# Patient Record
Sex: FEMALE | Race: WHITE | NOT HISPANIC OR LATINO | Employment: FULL TIME | URBAN - NONMETROPOLITAN AREA
[De-identification: names, ages, dates, MRNs, and addresses within clinical notes are randomized per-mention and may not be internally consistent; named-entity substitution may affect disease eponyms.]

---

## 2023-03-31 DIAGNOSIS — G43.009 MIGRAINE WITHOUT AURA AND WITHOUT STATUS MIGRAINOSUS, NOT INTRACTABLE: Primary | ICD-10-CM

## 2023-03-31 RX ORDER — TOPIRAMATE 25 MG/1
TABLET ORAL
Qty: 60 TABLET | Refills: 0 | Status: SHIPPED | OUTPATIENT
Start: 2023-03-31 | End: 2023-05-15

## 2023-04-14 ENCOUNTER — OFFICE VISIT (OUTPATIENT)
Dept: PRIMARY CARE | Facility: CLINIC | Age: 63
End: 2023-04-14

## 2023-04-14 VITALS
HEART RATE: 90 BPM | SYSTOLIC BLOOD PRESSURE: 108 MMHG | OXYGEN SATURATION: 98 % | WEIGHT: 123.5 LBS | DIASTOLIC BLOOD PRESSURE: 80 MMHG | HEIGHT: 63 IN | BODY MASS INDEX: 21.88 KG/M2

## 2023-04-14 DIAGNOSIS — H81.11 BPPV (BENIGN PAROXYSMAL POSITIONAL VERTIGO), RIGHT: ICD-10-CM

## 2023-04-14 DIAGNOSIS — M25.512 CHRONIC LEFT SHOULDER PAIN: Primary | ICD-10-CM

## 2023-04-14 DIAGNOSIS — G89.29 CHRONIC LEFT SHOULDER PAIN: Primary | ICD-10-CM

## 2023-04-14 DIAGNOSIS — Z00.00 ROUTINE GENERAL MEDICAL EXAMINATION AT A HEALTH CARE FACILITY: ICD-10-CM

## 2023-04-14 DIAGNOSIS — G43.009 MIGRAINE WITHOUT AURA AND WITHOUT STATUS MIGRAINOSUS, NOT INTRACTABLE: ICD-10-CM

## 2023-04-14 PROBLEM — G43.909 MIGRAINES: Status: ACTIVE | Noted: 2023-04-14

## 2023-04-14 PROBLEM — R56.9 SEIZURE-LIKE ACTIVITY (MULTI): Status: ACTIVE | Noted: 2023-04-14

## 2023-04-14 PROBLEM — G56.03 BILATERAL CARPAL TUNNEL SYNDROME: Status: ACTIVE | Noted: 2023-04-14

## 2023-04-14 PROCEDURE — 99213 OFFICE O/P EST LOW 20 MIN: CPT | Performed by: FAMILY MEDICINE

## 2023-04-14 ASSESSMENT — ENCOUNTER SYMPTOMS
ARTHRALGIAS: 1
APPETITE CHANGE: 0
PALPITATIONS: 0
HEADACHES: 1
CONSTIPATION: 0
DIFFICULTY URINATING: 0
FEVER: 0
TROUBLE SWALLOWING: 0
SEIZURES: 0
SHORTNESS OF BREATH: 0
UNEXPECTED WEIGHT CHANGE: 0
DIARRHEA: 0
COLOR CHANGE: 0
BLOOD IN STOOL: 0
JOINT SWELLING: 0
CONFUSION: 0
HEMATURIA: 0
NERVOUS/ANXIOUS: 0

## 2023-04-14 ASSESSMENT — PATIENT HEALTH QUESTIONNAIRE - PHQ9
SUM OF ALL RESPONSES TO PHQ9 QUESTIONS 1 AND 2: 0
1. LITTLE INTEREST OR PLEASURE IN DOING THINGS: NOT AT ALL
2. FEELING DOWN, DEPRESSED OR HOPELESS: NOT AT ALL

## 2023-04-14 NOTE — PROGRESS NOTES
"Subjective   Patient ID: Katie Garcia is a 62 y.o. female who presents for Shoulder Pain (Right shoulder pain that goes up into her neck, pt is having trouble dressing herself and doing things around her house, fingers feel cold, right arm pain , ringing in ears, hemorrhoids, frequent bloody noses, lightheaded when pt lays down for bed  ).  Left shoulder pain:  -pop/clicking   -onset multiple months  -pain at rest and worse w/ activity w/ weakness.   -pain radiated to elbow  -no known trauma  -taking tylenol  -ice/heat    Migraines:  -no longer working- left because she was constantly anxious- leading to decrease appetite and diarrhea. +wt loss. Diarrhea improved now. Appetite good.   -migraines much better     Adopted and found siblings:  -appears they have some cardiac issues    Complains of dizziness that last just 10sec when lying in bed. Only in getting into bed. Not w/ standing. No cp,sob,palps,syncope      Shoulder Pain   Pertinent negatives include no fever.       Review of Systems   Constitutional:  Negative for appetite change, fever and unexpected weight change.   HENT:  Negative for congestion and trouble swallowing.    Eyes:  Negative for visual disturbance.   Respiratory:  Negative for shortness of breath.    Cardiovascular:  Negative for chest pain, palpitations and leg swelling.   Gastrointestinal:  Negative for blood in stool, constipation and diarrhea.   Genitourinary:  Negative for difficulty urinating and hematuria.   Musculoskeletal:  Positive for arthralgias. Negative for gait problem and joint swelling.   Skin:  Negative for color change.   Allergic/Immunologic: Negative for immunocompromised state.   Neurological:  Positive for headaches. Negative for seizures and syncope.   Psychiatric/Behavioral:  Negative for confusion and suicidal ideas. The patient is not nervous/anxious.        Objective   /80   Pulse 90   Ht 1.6 m (5' 3\")   Wt 56 kg (123 lb 8 oz)   SpO2 98%   BMI 21.88 " kg/m²     Physical Exam  Constitutional:       General: She is not in acute distress.     Appearance: Normal appearance. She is not toxic-appearing.   HENT:      Head: Normocephalic.      Right Ear: Tympanic membrane normal.      Left Ear: Tympanic membrane normal.      Nose: Nose normal. No congestion or rhinorrhea.      Mouth/Throat:      Mouth: Mucous membranes are moist.   Eyes:      General:         Right eye: No discharge.         Left eye: No discharge.      Pupils: Pupils are equal, round, and reactive to light.   Cardiovascular:      Rate and Rhythm: Normal rate and regular rhythm.      Heart sounds: No murmur heard.  Pulmonary:      Effort: No respiratory distress.      Breath sounds: No wheezing or rhonchi.   Abdominal:      General: Abdomen is flat.      Palpations: Abdomen is soft.   Musculoskeletal:      Comments: Left shoulder: decrease rom in abduction at 90def. +empty can, int/ext/lift, +bicep flex, +lewis   Skin:     General: Skin is warm and dry.      Capillary Refill: Capillary refill takes less than 2 seconds.   Neurological:      General: No focal deficit present.      Mental Status: She is alert.   Psychiatric:         Mood and Affect: Mood normal.       Assessment/Plan   Problem List Items Addressed This Visit          Other    Migraines     Other Visit Diagnoses       Chronic left shoulder pain    -  Primary    Routine general medical examination at a health care facility        Relevant Orders    CT cardiac scoring wo IV contrast    BPPV (benign paroxysmal positional vertigo), right              Assessment:  -Migraine  Topamax    -Seizure like activity: from possible trauma brain damage vs migraine sequela : neg EEG and MRI (2022)     -Fhx of cardiac dz  Cardiac score ordered    -Left shoulder pain:  Concerning for RTC vs bicep   MRI ordered    -Vertigo w. Lying  Suspect BPPV  Home exercises given     mammo: refused

## 2023-04-18 ASSESSMENT — PATIENT HEALTH QUESTIONNAIRE - PHQ9
1. LITTLE INTEREST OR PLEASURE IN DOING THINGS: NOT AT ALL
2. FEELING DOWN, DEPRESSED OR HOPELESS: NOT AT ALL
SUM OF ALL RESPONSES TO PHQ9 QUESTIONS 1 AND 2: 0

## 2023-05-14 DIAGNOSIS — G43.009 MIGRAINE WITHOUT AURA AND WITHOUT STATUS MIGRAINOSUS, NOT INTRACTABLE: ICD-10-CM

## 2023-05-15 DIAGNOSIS — G43.009 MIGRAINE WITHOUT AURA AND WITHOUT STATUS MIGRAINOSUS, NOT INTRACTABLE: ICD-10-CM

## 2023-05-15 RX ORDER — TOPIRAMATE 25 MG/1
TABLET ORAL
Qty: 180 TABLET | Refills: 3 | Status: SHIPPED | OUTPATIENT
Start: 2023-05-15 | End: 2023-05-15 | Stop reason: SDUPTHER

## 2023-05-15 NOTE — TELEPHONE ENCOUNTER
Pt called and said that she needs a refill of her Topamax because she didn't realize that they . She also wanted to let you know that she had a seizure on Saturday. Said that it was a weird one and that it didn't last long. She wasn't sure if you wanted to talk with her about it or see her. I told her I would let you know and call her back.

## 2023-05-16 RX ORDER — TOPIRAMATE 25 MG/1
TABLET ORAL
Qty: 180 TABLET | Refills: 3 | Status: SHIPPED | OUTPATIENT
Start: 2023-05-16

## 2024-08-25 ENCOUNTER — APPOINTMENT (OUTPATIENT)
Dept: RADIOLOGY | Facility: HOSPITAL | Age: 64
End: 2024-08-25

## 2024-08-25 ENCOUNTER — HOSPITAL ENCOUNTER (EMERGENCY)
Facility: HOSPITAL | Age: 64
Discharge: HOME | End: 2024-08-25
Attending: STUDENT IN AN ORGANIZED HEALTH CARE EDUCATION/TRAINING PROGRAM

## 2024-08-25 VITALS
HEIGHT: 65 IN | SYSTOLIC BLOOD PRESSURE: 134 MMHG | RESPIRATION RATE: 17 BRPM | DIASTOLIC BLOOD PRESSURE: 71 MMHG | OXYGEN SATURATION: 96 % | TEMPERATURE: 97 F | WEIGHT: 125 LBS | HEART RATE: 73 BPM | BODY MASS INDEX: 20.83 KG/M2

## 2024-08-25 DIAGNOSIS — S92.001A CLOSED NONDISPLACED FRACTURE OF RIGHT CALCANEUS, UNSPECIFIED PORTION OF CALCANEUS, INITIAL ENCOUNTER: Primary | ICD-10-CM

## 2024-08-25 PROCEDURE — 96374 THER/PROPH/DIAG INJ IV PUSH: CPT | Mod: 59

## 2024-08-25 PROCEDURE — 73610 X-RAY EXAM OF ANKLE: CPT | Mod: RIGHT SIDE | Performed by: RADIOLOGY

## 2024-08-25 PROCEDURE — 73610 X-RAY EXAM OF ANKLE: CPT | Mod: RT

## 2024-08-25 PROCEDURE — 29515 APPLICATION SHORT LEG SPLINT: CPT | Mod: RT | Performed by: STUDENT IN AN ORGANIZED HEALTH CARE EDUCATION/TRAINING PROGRAM

## 2024-08-25 PROCEDURE — 73630 X-RAY EXAM OF FOOT: CPT | Mod: RT

## 2024-08-25 PROCEDURE — 73700 CT LOWER EXTREMITY W/O DYE: CPT | Mod: RT

## 2024-08-25 PROCEDURE — 73630 X-RAY EXAM OF FOOT: CPT | Mod: RIGHT SIDE | Performed by: RADIOLOGY

## 2024-08-25 PROCEDURE — 96375 TX/PRO/DX INJ NEW DRUG ADDON: CPT | Mod: 59

## 2024-08-25 PROCEDURE — 2500000004 HC RX 250 GENERAL PHARMACY W/ HCPCS (ALT 636 FOR OP/ED): Performed by: STUDENT IN AN ORGANIZED HEALTH CARE EDUCATION/TRAINING PROGRAM

## 2024-08-25 PROCEDURE — 99284 EMERGENCY DEPT VISIT MOD MDM: CPT | Mod: 25

## 2024-08-25 RX ORDER — OXYCODONE HYDROCHLORIDE 5 MG/1
5 TABLET ORAL EVERY 6 HOURS PRN
Qty: 12 TABLET | Refills: 0 | Status: SHIPPED | OUTPATIENT
Start: 2024-08-25 | End: 2024-08-29 | Stop reason: HOSPADM

## 2024-08-25 RX ORDER — KETOROLAC TROMETHAMINE 15 MG/ML
15 INJECTION, SOLUTION INTRAMUSCULAR; INTRAVENOUS ONCE
Status: COMPLETED | OUTPATIENT
Start: 2024-08-25 | End: 2024-08-25

## 2024-08-25 RX ORDER — MORPHINE SULFATE 4 MG/ML
4 INJECTION INTRAVENOUS ONCE
Status: COMPLETED | OUTPATIENT
Start: 2024-08-25 | End: 2024-08-25

## 2024-08-25 ASSESSMENT — LIFESTYLE VARIABLES
HAVE PEOPLE ANNOYED YOU BY CRITICIZING YOUR DRINKING: NO
HAVE YOU EVER FELT YOU SHOULD CUT DOWN ON YOUR DRINKING: NO
TOTAL SCORE: 0
EVER HAD A DRINK FIRST THING IN THE MORNING TO STEADY YOUR NERVES TO GET RID OF A HANGOVER: NO
EVER FELT BAD OR GUILTY ABOUT YOUR DRINKING: NO

## 2024-08-25 ASSESSMENT — COLUMBIA-SUICIDE SEVERITY RATING SCALE - C-SSRS
1. IN THE PAST MONTH, HAVE YOU WISHED YOU WERE DEAD OR WISHED YOU COULD GO TO SLEEP AND NOT WAKE UP?: NO
6. HAVE YOU EVER DONE ANYTHING, STARTED TO DO ANYTHING, OR PREPARED TO DO ANYTHING TO END YOUR LIFE?: NO
2. HAVE YOU ACTUALLY HAD ANY THOUGHTS OF KILLING YOURSELF?: NO

## 2024-08-25 ASSESSMENT — PAIN SCALES - GENERAL
PAINLEVEL_OUTOF10: 6
PAINLEVEL_OUTOF10: 6

## 2024-08-25 ASSESSMENT — PAIN DESCRIPTION - LOCATION: LOCATION: ANKLE

## 2024-08-25 ASSESSMENT — PAIN DESCRIPTION - ORIENTATION: ORIENTATION: RIGHT

## 2024-08-25 ASSESSMENT — PAIN DESCRIPTION - PAIN TYPE: TYPE: ACUTE PAIN

## 2024-08-25 ASSESSMENT — PAIN - FUNCTIONAL ASSESSMENT: PAIN_FUNCTIONAL_ASSESSMENT: 0-10

## 2024-08-25 NOTE — ED PROVIDER NOTES
HPI   Chief Complaint   Patient presents with    Ankle Pain       Patient is a 64-year-old female presenting after a right ankle injury.  The patient was going down the stairs and hit her right heel on cement after chasing after her dog.  Has been unable to ambulate since then.  The patient has significant pain.  Denies any discoloration or obvious deformity.  Has not take anything for pain.    EMS did  the patient and immobilized the extremity and gave Dilaudid for pain control with improvement of symptoms.              Patient History   History reviewed. No pertinent past medical history.  Past Surgical History:   Procedure Laterality Date    BREAST LUMPECTOMY  06/03/2016    Breast Surgery Lumpectomy    OTHER SURGICAL HISTORY  01/20/2022    Sinus surgery    TONSILLECTOMY  06/03/2016    Tonsillectomy With Adenoidectomy    TUBAL LIGATION  06/03/2016    Tubal Ligation     Family History   Problem Relation Name Age of Onset    Heart defect Brother       Social History     Tobacco Use    Smoking status: Not on file    Smokeless tobacco: Not on file   Substance Use Topics    Alcohol use: Not on file    Drug use: Not on file       Physical Exam   ED Triage Vitals [08/25/24 1144]   Temperature Heart Rate Respirations BP   36.1 °C (97 °F) 77 17 146/65      Pulse Ox Temp Source Heart Rate Source Patient Position   99 % Temporal Monitor Lying      BP Location FiO2 (%)     Left arm --       Physical Exam  Constitutional:       General: She is not in acute distress.     Appearance: She is not toxic-appearing.   Cardiovascular:      Rate and Rhythm: Normal rate and regular rhythm.   Pulmonary:      Effort: No respiratory distress.      Breath sounds: Normal breath sounds. No wheezing or rales.   Abdominal:      Palpations: Abdomen is soft.      Tenderness: There is no abdominal tenderness.   Musculoskeletal:      Comments: Intact DP and PT pulse to the right lower extremity.  Patient does have significant tenderness to  the calcaneus.  Bentley positive, Yu negative.   Neurological:      Mental Status: She is alert.      Comments: Appropriate sensation and distal motor to the right lower extremity with limited range of motion of the ankle secondary to pain           ED Course & MDM   Diagnoses as of 08/25/24 1812   Closed nondisplaced fracture of right calcaneus, unspecified portion of calcaneus, initial encounter                 No data recorded     Stepan Coma Scale Score: 15 (08/25/24 1149 : Bibi Madrigal RN)                           Medical Decision Making  Patient is a 64-year-old female presenting for injury to the right ankle area.  X-rays were obtained and significant for a closed right calcaneus fracture with intra-articular involvement.  The patient had a Yu test which was normal so I am less concerned about Achilles rupture at this time.  The patient's pain was treated with both narcotic and nonnarcotic IV pain medication.  Did improve with immobilization from the splint.  Please see procedure note for complete details.    I did speak to the orthopedic surgeon on-call who recommended splint, immobilization, crutches and follow-up with orthopedics in 1 to 2 days.  This was provided as well as a prescription for narcotic-based pain medication for symptom relief in the meantime.  She has remained nonambulatory and nonweightbearing with regards the right lower extremity        Procedure  Splint Application    Performed by: Sarath Johnson MD  Authorized by: Sarath Johnson MD    Consent:     Consent obtained:  Verbal    Consent given by:  Patient    Risks discussed:  Discoloration, numbness, swelling and pain    Alternatives discussed:  No treatment, referral and delayed treatment  Universal protocol:     Patient identity confirmed:  Verbally with patient  Pre-procedure details:     Distal neurologic exam:  Normal    Distal perfusion: distal pulses strong and brisk capillary refill    Procedure details:      Location:  Ankle    Ankle location:  R ankle    Splint type:  Short leg    Supplies:  Fiberglass  Post-procedure details:     Distal neurologic exam:  Unchanged    Distal perfusion: unchanged      Procedure completion:  Tolerated       Sarath Johnson MD  08/25/24 6070

## 2024-08-26 ENCOUNTER — PREP FOR PROCEDURE (OUTPATIENT)
Dept: ORTHOPEDIC SURGERY | Facility: CLINIC | Age: 64
End: 2024-08-26

## 2024-08-26 ENCOUNTER — CLINICAL SUPPORT (OUTPATIENT)
Dept: PREADMISSION TESTING | Facility: HOSPITAL | Age: 64
End: 2024-08-26

## 2024-08-26 DIAGNOSIS — S92.041A CLOSED DISPLACED FRACTURE OF TUBEROSITY OF RIGHT CALCANEUS, UNSPECIFIED FRACTURE MORPHOLOGY, INITIAL ENCOUNTER: ICD-10-CM

## 2024-08-26 DIAGNOSIS — S92.041A CLOSED DISPLACED FRACTURE OF TUBEROSITY OF RIGHT CALCANEUS, UNSPECIFIED FRACTURE MORPHOLOGY, INITIAL ENCOUNTER: Primary | ICD-10-CM

## 2024-08-26 RX ORDER — FLUTICASONE PROPIONATE 50 MCG
1 SPRAY, SUSPENSION (ML) NASAL AS NEEDED
COMMUNITY

## 2024-08-26 RX ORDER — DOCUSATE SODIUM 250 MG
CAPSULE ORAL AS NEEDED
COMMUNITY

## 2024-08-26 RX ORDER — SODIUM CHLORIDE, SODIUM LACTATE, POTASSIUM CHLORIDE, CALCIUM CHLORIDE 600; 310; 30; 20 MG/100ML; MG/100ML; MG/100ML; MG/100ML
20 INJECTION, SOLUTION INTRAVENOUS CONTINUOUS
Status: CANCELLED | OUTPATIENT
Start: 2024-08-29

## 2024-08-26 RX ORDER — CEFAZOLIN SODIUM 2 G/100ML
2 INJECTION, SOLUTION INTRAVENOUS ONCE
Status: CANCELLED | OUTPATIENT
Start: 2024-08-29 | End: 2024-08-26

## 2024-08-26 RX ORDER — DEXTROMETHORPHAN HYDROBROMIDE, GUAIFENESIN 5; 100 MG/5ML; MG/5ML
650 LIQUID ORAL EVERY 8 HOURS PRN
COMMUNITY

## 2024-08-26 NOTE — H&P
History Of Present Illness    Patient seen in [facility] ED.  Ortho NP messaged about right calcaneus fracture.  Needs surgical repair.  Will schedule for Thursday morning.  May make an appointment to see me in the office prior if patient desires.  Nam Shell MD

## 2024-08-27 ENCOUNTER — DOCUMENTATION (OUTPATIENT)
Dept: PREADMISSION TESTING | Facility: HOSPITAL | Age: 64
End: 2024-08-27

## 2024-08-27 ENCOUNTER — PRE-ADMISSION TESTING (OUTPATIENT)
Dept: PREADMISSION TESTING | Facility: HOSPITAL | Age: 64
End: 2024-08-27

## 2024-08-27 ENCOUNTER — APPOINTMENT (OUTPATIENT)
Dept: ORTHOPEDIC SURGERY | Facility: CLINIC | Age: 64
End: 2024-08-27

## 2024-08-27 ENCOUNTER — LAB (OUTPATIENT)
Dept: LAB | Facility: LAB | Age: 64
End: 2024-08-27

## 2024-08-27 VITALS
DIASTOLIC BLOOD PRESSURE: 76 MMHG | OXYGEN SATURATION: 98 % | BODY MASS INDEX: 20.83 KG/M2 | SYSTOLIC BLOOD PRESSURE: 123 MMHG | HEIGHT: 65 IN | WEIGHT: 125 LBS | TEMPERATURE: 97 F | HEART RATE: 85 BPM

## 2024-08-27 DIAGNOSIS — Z01.818 PREOP TESTING: ICD-10-CM

## 2024-08-27 DIAGNOSIS — Z01.818 PREOP TESTING: Primary | ICD-10-CM

## 2024-08-27 LAB
ANION GAP SERPL CALC-SCNC: 13 MMOL/L (ref 10–20)
BASOPHILS # BLD AUTO: 0.07 X10*3/UL (ref 0–0.1)
BASOPHILS NFR BLD AUTO: 0.8 %
BUN SERPL-MCNC: 10 MG/DL (ref 6–23)
CALCIUM SERPL-MCNC: 9.2 MG/DL (ref 8.6–10.3)
CHLORIDE SERPL-SCNC: 103 MMOL/L (ref 98–107)
CO2 SERPL-SCNC: 26 MMOL/L (ref 21–32)
CREAT SERPL-MCNC: 0.81 MG/DL (ref 0.5–1.05)
EGFRCR SERPLBLD CKD-EPI 2021: 82 ML/MIN/1.73M*2
EOSINOPHIL # BLD AUTO: 0.07 X10*3/UL (ref 0–0.7)
EOSINOPHIL NFR BLD AUTO: 0.8 %
ERYTHROCYTE [DISTWIDTH] IN BLOOD BY AUTOMATED COUNT: 13 % (ref 11.5–14.5)
GLUCOSE SERPL-MCNC: 112 MG/DL (ref 74–99)
HCT VFR BLD AUTO: 37.4 % (ref 36–46)
HGB BLD-MCNC: 12.8 G/DL (ref 12–16)
IMM GRANULOCYTES # BLD AUTO: 0.03 X10*3/UL (ref 0–0.7)
IMM GRANULOCYTES NFR BLD AUTO: 0.3 % (ref 0–0.9)
LYMPHOCYTES # BLD AUTO: 2.51 X10*3/UL (ref 1.2–4.8)
LYMPHOCYTES NFR BLD AUTO: 28.5 %
MCH RBC QN AUTO: 33.5 PG (ref 26–34)
MCHC RBC AUTO-ENTMCNC: 34.2 G/DL (ref 32–36)
MCV RBC AUTO: 98 FL (ref 80–100)
MONOCYTES # BLD AUTO: 0.72 X10*3/UL (ref 0.1–1)
MONOCYTES NFR BLD AUTO: 8.2 %
NEUTROPHILS # BLD AUTO: 5.4 X10*3/UL (ref 1.2–7.7)
NEUTROPHILS NFR BLD AUTO: 61.4 %
NRBC BLD-RTO: 0 /100 WBCS (ref 0–0)
PLATELET # BLD AUTO: 206 X10*3/UL (ref 150–450)
POTASSIUM SERPL-SCNC: 4.9 MMOL/L (ref 3.5–5.3)
RBC # BLD AUTO: 3.82 X10*6/UL (ref 4–5.2)
SODIUM SERPL-SCNC: 137 MMOL/L (ref 136–145)
WBC # BLD AUTO: 8.8 X10*3/UL (ref 4.4–11.3)

## 2024-08-27 PROCEDURE — 87081 CULTURE SCREEN ONLY: CPT | Mod: AHULAB | Performed by: PHYSICIAN ASSISTANT

## 2024-08-27 PROCEDURE — 80048 BASIC METABOLIC PNL TOTAL CA: CPT

## 2024-08-27 PROCEDURE — 99204 OFFICE O/P NEW MOD 45 MIN: CPT | Performed by: PHYSICIAN ASSISTANT

## 2024-08-27 PROCEDURE — 36415 COLL VENOUS BLD VENIPUNCTURE: CPT

## 2024-08-27 PROCEDURE — 85025 COMPLETE CBC W/AUTO DIFF WBC: CPT

## 2024-08-27 RX ORDER — CHLORHEXIDINE GLUCONATE ORAL RINSE 1.2 MG/ML
SOLUTION DENTAL
Qty: 475 ML | Refills: 0 | Status: SHIPPED | OUTPATIENT
Start: 2024-08-27

## 2024-08-27 ASSESSMENT — ENCOUNTER SYMPTOMS
RHINORRHEA: 0
VISUAL CHANGE: 0
SHORTNESS OF BREATH: 0
ABDOMINAL PAIN: 0
DYSPNEA AT REST: 0
ABDOMINAL DISTENTION: 0
EYE PAIN: 0
WOUND: 0
TROUBLE SWALLOWING: 0
FEVER: 0
PALPITATIONS: 0
EYE DISCHARGE: 0
EXCESSIVE BLEEDING: 0
CHILLS: 0
NUMBNESS: 0
MYALGIAS: 0
WEAKNESS: 0
NAUSEA: 0
COUGH: 0
DOUBLE VISION: 0
LIGHT-HEADEDNESS: 0
DIARRHEA: 0
CONFUSION: 0
NECK STIFFNESS: 0
VOMITING: 0
BLOOD IN STOOL: 0
DIFFICULTY URINATING: 0
SINUS CONGESTION: 0
NECK PAIN: 0
UNEXPECTED WEIGHT CHANGE: 0
SKIN CHANGES: 0
LIMITED RANGE OF MOTION: 0
DYSURIA: 0
HEMOPTYSIS: 0
ARTHRALGIAS: 1
DYSPNEA WITH EXERTION: 0
BRUISES/BLEEDS EASILY: 1
WHEEZING: 0
CONSTIPATION: 1

## 2024-08-27 NOTE — H&P (VIEW-ONLY)
"[facility]/PAT Evaluation       Name: Radha Garcia (Radha Garcia \"Katie\")  /Age: 1960/64 y.o.       Date of Consult: 24    Referring Provider: Dr. Shell    Surgery, Date, and Length: Right Foot Calcaneus Fracture Open Reduction Internal Fixation - Right , 24, 120MIN    Radha Garcia is a 64 year-old female who presents to the [facility] for perioperative risk assessment prior to surgery.    Patient presents with a primary diagnosis of right heel pain. The patient was going down the stairs and hit her right heel on cement after chasing after her dog. She sought emergent medical attention 24. Xrays show acute calcaneal fracture extending from the calcaneal tuberosity to the sinus tarsi with distraction of the posterior calcaneus by 1 cm. CT right ankle also obtained which showed \"comminuted tongue-type intra-articular fracture of the  calcaneus with significant comminution of fracture fragments and depression of the articular surface\". Pt to have ORIF as planned.     This note was created in part upon personal review of patient's medical records.      Patient is scheduled to have Right Foot Calcaneus Fracture Open Reduction Internal Fixation - Right    Pt does mention she woke up during exploratory laparoscopy and required additional anesthesia; which then caused prolonged sedation after surgery was complete.    Pt denies any past history of anesthetic complications such as PONV, awareness, prolonged sedation, dental damage, aspiration, cardiac arrest, difficult intubation, difficult I.V. access or unexpected hospital admissions.  NO malignant hyperthermia and or pseudocholinesterase deficiency.  No history of blood transfusions     The patient is not a Orthodox and will accept blood and blood products if medically indicated.   Type and screen NOT sent.     Past Medical History:   Diagnosis Date    Breast cancer in female (Multi)     Migraine headache     Right calcaneal fracture  "       Past Surgical History:   Procedure Laterality Date    BREAST LUMPECTOMY  06/03/2016    Breast Surgery Lumpectomy    OTHER SURGICAL HISTORY  01/20/2022    Sinus surgery    TONSILLECTOMY  06/03/2016    Tonsillectomy With Adenoidectomy    TUBAL LIGATION  06/03/2016    Tubal Ligation       Patient  has no history on file for sexual activity.    Family History   Adopted: Yes   Problem Relation Name Age of Onset    Heart defect Brother      Coronary artery disease Brother      Heart attack Brother      Heart attack Maternal Grandmother      Stroke Maternal Grandmother       Social History     Socioeconomic History    Marital status:      Spouse name: Not on file    Number of children: Not on file    Years of education: Not on file    Highest education level: Not on file   Occupational History    Not on file   Tobacco Use    Smoking status: Every Day     Current packs/day: 1.00     Average packs/day: 1 pack/day for 47.7 years (47.7 ttl pk-yrs)     Types: Cigarettes     Start date: 1977    Smokeless tobacco: Never   Vaping Use    Vaping status: Never Used   Substance and Sexual Activity    Alcohol use: Not Currently    Drug use: Yes     Types: Marijuana     Comment: once a week    Sexual activity: Not on file   Other Topics Concern    Not on file   Social History Narrative    Not on file     Social Determinants of Health     Financial Resource Strain: Not on file   Food Insecurity: Not on file   Transportation Needs: Not on file   Physical Activity: Not on file   Stress: Not on file   Social Connections: Not on file   Intimate Partner Violence: Not on file   Housing Stability: Not on file        No Known Allergies      Current Outpatient Medications:     docusate sodium 250 mg capsule, Take by mouth if needed., Disp: , Rfl:     fluticasone (Flonase) 50 mcg/actuation nasal spray, Administer 1 spray into each nostril if needed for rhinitis. Shake gently. Before first use, prime pump. After use, clean tip and  replace cap., Disp: , Rfl:     oxyCODONE (Roxicodone) 5 mg immediate release tablet, Take 1 tablet (5 mg) by mouth every 6 hours if needed for severe pain (7 - 10) for up to 3 days., Disp: 12 tablet, Rfl: 0    acetaminophen (Tylenol 8 HOUR) 650 mg ER tablet, Take 1 tablet (650 mg) by mouth every 8 hours if needed for mild pain (1 - 3). Do not crush, chew, or split., Disp: , Rfl:     chlorhexidine (Peridex) 0.12 % solution, Swish for 30 seconds and spit 15mL of solution the night before and morning of surgery (Patient not taking: Reported on 8/27/2024), Disp: 475 mL, Rfl: 0         PAT ROS:   Constitutional:    no fever   no chills   no unexpected weight change  Neuro/Psych:    no numbness   no weakness   no light-headedness   no confusion  Eyes:    no discharge   no pain   no vision loss   no diplopia   no visual disturbance  Ears:    no ear pain   no hearing loss   no tinnitus  Nose:    no nasal discharge   no sinus congestion   no epistaxis  Mouth:    no dental issues   no mouth pain   no oral bleeding   no mouth lesions  Throat:    no throat pain   no dysphagia  Neck:    no neck pain   no neck stiffness  Cardio:    Functional 4 Mets. Patient denies SOB walking up 2 flights of stairs   Chasing / walking dog; cooking, cleaning , grocery shopping    no chest pain   no palpitations   no peripheral edema   no dyspnea   no JEFFREY  Respiratory:    no cough   no wheezing   no hemoptysis   no shortness of breath  Endocrine:    no cold intolerance   no heat intolerance  GI:    no abdominal distention   no abdominal pain   constipation (docusate)   no diarrhea   no nausea   no vomiting   no blood in stool  :    no difficulty urinating   no dysuria   no oliguria  Musculoskeletal:    arthralgias (right hip, right ankle, right heel)   no myalgias   no decreased ROM  Hematologic:    bruises/bleeds easily   no excessive bleeding   no history of blood transfusion   no blood clots  Skin:   Ecchymosis of right hip, right foot   no  skin changes   no sores/wound   no rash      Physical Exam  Constitutional:       General: She is not in acute distress.     Appearance: Normal appearance. She is not ill-appearing, toxic-appearing or diaphoretic.   HENT:      Head: Normocephalic and atraumatic.      Nose: Nose normal. No rhinorrhea.      Mouth/Throat:      Pharynx: No oropharyngeal exudate.   Eyes:      Extraocular Movements: Extraocular movements intact.      Conjunctiva/sclera: Conjunctivae normal.   Cardiovascular:      Rate and Rhythm: Normal rate and regular rhythm.      Heart sounds: No murmur heard.     No friction rub. No gallop.      Comments: Functional 4 Mets. Patient denies SOB walking up 2 flights of stairs     Pulmonary:      Effort: Pulmonary effort is normal. No respiratory distress.      Breath sounds: Normal breath sounds. No stridor. No wheezing or rhonchi.   Abdominal:      General: Bowel sounds are normal. There is no distension.      Palpations: Abdomen is soft. There is no mass.      Tenderness: There is no abdominal tenderness. There is no guarding or rebound.      Hernia: No hernia is present.   Musculoskeletal:         General: Swelling (right foot (unable to fully assess due to cast in place)) and tenderness (right hindfoot pain; right foot in cast) present. No deformity or signs of injury. Normal range of motion.      Cervical back: Normal range of motion and neck supple. No rigidity or tenderness.   Skin:     General: Skin is warm and dry.      Coloration: Skin is not jaundiced or pale.      Findings: No bruising, erythema, lesion or rash.   Neurological:      General: No focal deficit present.      Mental Status: She is alert and oriented to person, place, and time.      Cranial Nerves: No cranial nerve deficit.      Sensory: No sensory deficit.      Motor: No weakness.      Coordination: Coordination normal.   Psychiatric:         Mood and Affect: Mood normal.         Behavior: Behavior normal.          PAT AIRWAY:  "  Airway:     Mallampati::  II    Neck ROM::  Full   Few missing teeth bottom and upper left; no loose tooth      Visit Vitals  /76   Pulse 85   Temp 36.1 °C (97 °F)   Ht 1.66 m (5' 5.35\")   Wt 56.7 kg (125 lb)   SpO2 98%   BMI 20.58 kg/m²   Smoking Status Every Day   BSA 1.62 m²      LABS:  Lab Results   Component Value Date    WBC 8.8 08/27/2024    HGB 12.8 08/27/2024    HCT 37.4 08/27/2024    MCV 98 08/27/2024     08/27/2024      Lab Results   Component Value Date    GLUCOSE 112 (H) 08/27/2024    CALCIUM 9.2 08/27/2024     08/27/2024    K 4.9 08/27/2024    CO2 26 08/27/2024     08/27/2024    BUN 10 08/27/2024    CREATININE 0.81 08/27/2024        Assessment and Plan:     Patient is a 64-year-old female scheduled for a Right Foot Calcaneus Fracture Open Reduction Internal Fixation - Right with Dr. Shell on .  Patient has no active cardiac symptoms.   Patient denies any chest pain, tightness, heaviness, pressure, radiating pain, palpitations, irregular heartbeats, lightheadedness, cough, congestion, shortness of breath, JEFFREY, PND, near syncope, weight loss or gain.     RCRI  0  , 3.9 % Risk of MACE    Pulmonary:  Nicotine dependence - Patient has history of nicotine dependency. Smoking cessation education was provided to the patient.Cessation encouraged. - Physiological and physical aspects of tobacco addiction as well as strategies for quitting were discussed. - Counseling was given focusing on the harmful effects of this addiction especially given the patient's medical condition(s) which will be worsened because of the chemicals in tobacco       Hematology:  Patient instructed to ambulate as soon as possible postoperatively to decrease thromboembolic risk.   Initiate mechanical DVT prophylaxis as soon as possible and initiate chemical prophylaxis when deemed safe from a bleeding standpoint post surgery.     LABS: CBC , BMP and MRSA ordered. Lab results reviewed and unremarkable with " exception of slightly elevated nonfasting blood glucose of 112mg/dL in non-diabetic.      STOP BANG: >50 = 1    Caprini: 4    Risk assessment complete.  Patient is scheduled for a low to intermediate surgical risk procedure.        Preoperative medication instructions were provided and reviewed with the patient.  Any additional testing or evaluation was explained to the patient.  Nothing by mouth instructions were discussed and patient's questions were answered prior to conclusion to this encounter.  Patient verbalized understanding of preoperative instructions given in preadmission testing; discharge instructions available in EMR.    This note was dictated by a speech recognition.  Minor errors may have been detected in a speech recognition.

## 2024-08-27 NOTE — CPM/PAT NURSE NOTE
"CPM/PAT Nurse Note      Name: Radha Garcia (Radha Garcia \"Katie\")  /Age: 1960/64 y.o.       Past Medical History:   Diagnosis Date    Breast cancer in female (Multi)     Migraine headache     Right calcaneal fracture        Past Surgical History:   Procedure Laterality Date    BREAST LUMPECTOMY  2016    Breast Surgery Lumpectomy    OTHER SURGICAL HISTORY  2022    Sinus surgery    TONSILLECTOMY  2016    Tonsillectomy With Adenoidectomy    TUBAL LIGATION  2016    Tubal Ligation       Patient  has no history on file for sexual activity.    Family History   Problem Relation Name Age of Onset    Heart defect Brother         No Known Allergies    Prior to Admission medications    Medication Sig Start Date End Date Taking? Authorizing Provider   acetaminophen (Tylenol 8 HOUR) 650 mg ER tablet Take 1 tablet (650 mg) by mouth every 8 hours if needed for mild pain (1 - 3). Do not crush, chew, or split.    Historical Provider, MD   chlorhexidine (Peridex) 0.12 % solution Swish for 30 seconds and spit 15mL of solution the night before and morning of surgery  Patient not taking: Reported on 2024   Ann Bajwa PA-C   docusate sodium 250 mg capsule Take by mouth if needed.    Historical Provider, MD   fluticasone (Flonase) 50 mcg/actuation nasal spray Administer 1 spray into each nostril if needed for rhinitis. Shake gently. Before first use, prime pump. After use, clean tip and replace cap.    Historical Provider, MD   oxyCODONE (Roxicodone) 5 mg immediate release tablet Take 1 tablet (5 mg) by mouth every 6 hours if needed for severe pain (7 - 10) for up to 3 days. 24  Sarath Johnson MD   topiramate (Topamax) 25 mg tablet TAKE 1 TABLET BY MOUTH AT BEDTIME  FOR 1 WEEK  THEN TAKE 1 TABLET TWICE A DAY 23  DO OLENA Contreras     DASI Risk Score    No data to display       Caprini DVT Assessment    No data to display   "     Modified Frailty Index    No data to display       CHADS2 Stroke Risk  Current as of 18 minutes ago        N/A 3 to 100%: High Risk   2 to < 3%: Medium Risk   0 to < 2%: Low Risk     Last Change: N/A          This score determines the patient's risk of having a stroke if the patient has atrial fibrillation.        This score is not applicable to this patient. Components are not calculated.          Revised Cardiac Risk Index    No data to display       Apfel Simplified Score    No data to display       Risk Analysis Index Results This Encounter    No data found in the last 1 encounters.       After Visit Summary (AVS) reviewed and patient verbalized good understanding of medications and NPO instructions.     Nurse Plan of Action:

## 2024-08-27 NOTE — CPM/PAT NURSE NOTE
"CPM/PAT Nurse Note      Name: Radha Garcia (Radha Garcia \"Katie\")  /Age: 1960/64 y.o.       Past Medical History:   Diagnosis Date    Breast cancer in female (Multi)     Migraine headache     Right calcaneal fracture        Past Surgical History:   Procedure Laterality Date    BREAST LUMPECTOMY  2016    Breast Surgery Lumpectomy    OTHER SURGICAL HISTORY  2022    Sinus surgery    TONSILLECTOMY  2016    Tonsillectomy With Adenoidectomy    TUBAL LIGATION  2016    Tubal Ligation       Patient  has no history on file for sexual activity.    Family History   Problem Relation Name Age of Onset    Heart defect Brother         No Known Allergies    Prior to Admission medications    Medication Sig Start Date End Date Taking? Authorizing Provider   acetaminophen (Tylenol 8 HOUR) 650 mg ER tablet Take 1 tablet (650 mg) by mouth every 8 hours if needed for mild pain (1 - 3). Do not crush, chew, or split.    Historical Provider, MD   chlorhexidine (Peridex) 0.12 % solution Swish for 30 seconds and spit 15mL of solution the night before and morning of surgery  Patient not taking: Reported on 2024   Ann Bajwa PA-C   docusate sodium 250 mg capsule Take by mouth if needed.    Historical Provider, MD   fluticasone (Flonase) 50 mcg/actuation nasal spray Administer 1 spray into each nostril if needed for rhinitis. Shake gently. Before first use, prime pump. After use, clean tip and replace cap.    Historical Provider, MD   oxyCODONE (Roxicodone) 5 mg immediate release tablet Take 1 tablet (5 mg) by mouth every 6 hours if needed for severe pain (7 - 10) for up to 3 days. 24  Sarath Johnson MD   topiramate (Topamax) 25 mg tablet TAKE 1 TABLET BY MOUTH AT BEDTIME  FOR 1 WEEK  THEN TAKE 1 TABLET TWICE A DAY 23  DO OLENA Contreras     DASI Risk Score    No data to display       Caprini DVT Assessment    No data to display   "     Modified Frailty Index    No data to display       CHADS2 Stroke Risk  Current as of 17 minutes ago        N/A 3 to 100%: High Risk   2 to < 3%: Medium Risk   0 to < 2%: Low Risk     Last Change: N/A          This score determines the patient's risk of having a stroke if the patient has atrial fibrillation.        This score is not applicable to this patient. Components are not calculated.          Revised Cardiac Risk Index    No data to display       Apfel Simplified Score    No data to display       Risk Analysis Index Results This Encounter    No data found in the last 1 encounters.     After Visit Summary (AVS) reviewed and patient verbalized good understanding of medications and NPO instructions.  Pre-op infection prevention measures:  CHG showers and mouthwash reviewed, understanding voiced.  CHG soap given and patient verbalized need to pick CHG mouthwash at their preferred local pharmacy.       Nurse Plan of Action:

## 2024-08-27 NOTE — PREPROCEDURE INSTRUCTIONS
Medication List            Accurate as of August 27, 2024 12:06 PM. Always use your most recent med list.                acetaminophen 650 mg ER tablet  Commonly known as: Tylenol 8 HOUR  Medication Adjustments for Surgery: Take morning of surgery with sip of water, no other fluids  Notes to patient: If needed     chlorhexidine 0.12 % solution  Commonly known as: Peridex  Swish for 30 seconds and spit 15mL of solution the night before and morning of surgery     docusate sodium 250 mg capsule  Medication Adjustments for Surgery: Continue until night before surgery     fluticasone 50 mcg/actuation nasal spray  Commonly known as: Flonase  Notes to patient: Ok to use morning of surgery if needed     oxyCODONE 5 mg immediate release tablet  Commonly known as: Roxicodone  Take 1 tablet (5 mg) by mouth every 6 hours if needed for severe pain (7 - 10) for up to 3 days.  Medication Adjustments for Surgery: Take morning of surgery with sip of water, no other fluids  Notes to patient: If needed                            **Concerning above medication instructions, if medication is normally taken at night, continue normal schedule.**  **DO NOT TAKE NIGHT PRIOR AND MORNING OF SURGERY**    CONTACT SURGEON'S OFFICE IF YOU DEVELOP:  * Fever = 100.4 F   * New respiratory symptoms (e.g. cough, shortness of breath, respiratory distress, sore throat)  * Recent loss of taste or smell  *Flu like symptoms such as headache, fatigue or gastrointestinal symptoms  * You develop any open sores, shingles, burning or painful urination   AND/OR:  * You no longer wish to have the surgery.  * Any other personal circumstances change that may lead to the need to cancel or defer this surgery.  *You were admitted to any hospital within one week of your planned procedure.    SMOKING:  *Quitting smoking can make a huge difference to your health and recovery from surgery.    *If you need help with quitting, call 1-176-QUIT-NOW.    THE DAY BEFORE  SURGERY:  *Do not eat any food after midnight the night before surgery.   *You are permitted to drink clear liquids (i.e. water, black coffee (no milk or cream), tea, apple juice and electrolyte drinks (gatorade)) up to 13.5 ounces, up to 2 hours before your arrival time.  *You may chew gum until 2 hours before your surgery    SURGICAL TIME  *You will be contacted between 2 p.m. and 6 p.m. the business day before your surgery with your arrival time.  *If you haven't received a call by 6pm, call [phone].  *Scheduled surgery times may change and you will be notified if this occurs-check your personal voicemail for any updates.    ON THE MORNING OF SURGERY:  *Wear comfortable, loose fitting clothing.   *Do not use moisturizers, creams, lotions or perfume.  *All jewelry and valuables should be left at home.  *Prosthetic devices such as contact lenses, hearing aids, dentures, eyelash extensions, hairpins and body piercing must be removed before surgery.    BRING WITH YOU:  *Photo ID and insurance card  *Current list of medicines and allergies  *Pacemaker/Defibrillator/Heart stent cards  *CPAP machine and mask  *Slings/splints/crutches  *Copy of your complete Advanced Directive/DHPOA-if applicable  *Neurostimulator implant remote    PARKING AND ARRIVAL:  *Check in at the Main Entrance desk and let them know you are here for surgery.  *You will be directed to the 2nd floor surgical waiting area.    AFTER OUTPATIENT SURGERY:  *A responsible adult MUST accompany you at the time of discharge and stay with you for 24 hours after your surgery.  *You may NOT drive yourself home after surgery.  *You may use a taxi or ride sharing service ([employer], Uber) to return home ONLY if you are accompanied by a friend or family member.  *Instructions for resuming your medications will be provided by your surgeon.        Patient Information: Oral/Dental Rinse  **This is a prescription; pick it up at your preferred local pharmacy **  What  is oral/dental rinse?   It is a mouthwash. It is a way of cleaning the mouth with a germ killing solution before your surgery.  The solution contains chlorhexidine, commonly known as CHG.   It is used inside the mouth to kill a bacteria known as Staphylococcus aureus.  Let your doctor know if you are allergic to Chlorhexidine.    Why do I need to use CHG oral/dental rinse?  The CHG oral/dental rinse helps to kill a bacteria in your mouth known a Staphylococcus aureus.     This reduces the risk of infection at the surgical site.      Using your CHG oral/dental rinse  STEPS:  Use your CHG oral/dental rinse after you brush your teeth the night before (at bedtime) and the morning of your surgery.  Follow all directions on your prescription label.    Use the cap on the container to measure 15ml (fill cap to fill line)  Swish (gargle if you can) the mouthwash in your mouth for at least 30 seconds, (do not to swallow) spit out  After you use your CHG rinse, do not rinse your mouth with water, drink or eat.  Please refer to prescription label for the appropriate time to resume oral intake  Dental rinse comes in one size bottle: 473ml ~16oz.  You will have leftover    rinse, discard after this use.    What side effects might I have using the CHG oral/dental rinse?  CHG rinse will stick to plaque on the teeth.  Brush and floss just before use.  Teeth brushing will help avoid staining of plaque during use.    Who should I contact if I have questions about the CHG oral/dental rinse?  Please call [facility], Preadmission Testing at [phone] if you have any questions      Home Preoperative Antibacterial Shower     What is a home preoperative antibacterial shower?  This shower is a way of cleaning the skin with a germ killing soap before surgery.  The soap contains chlorhexidine, commonly known as CHG.  CHG is a soap for your skin with germ killing ability.  Let your doctor know if you are  allergic to chlorhexidine.    Why do I need to take a preoperative antibacterial shower?  Skin is not sterile.  It is best to try to make your skin as free of germs as possible before surgery.  Proper cleansing with a germ killing soap before surgery can lower the number of germs on your skin.  This helps to reduce the risk of infection at the surgical site.  Following the instructions listed below will help you prepare your skin for surgery.      How do I use the CHG skin cleanser?  Steps:  Begin using your CHG soap five days before your scheduled surgery on ________________________.    Days 1-4 Shower before bed:  Wash your face and genitals with your normal soap and rinse.    Wash and rinse your hair using the CHG soap. Rinse completely, do not condition your   Hair.          3.    Apply the CHG soap to a clean wet washcloth.  Turn the water off or move away                From the water spray to avoid premature rinsing of the CHG soap as you are applying.     4.   Lather your entire body from the neck down.  Do not use on your face or genitals.   Pay special attention to the area(s) where your incision(s) will be located unless they are on your face.  Avoid scrubbing your skin too hard.  The important point is to have the CHG soap sit on your skin for 3 minutes.    When the 3 minutes are up, turn on the water and rinse the CHG soap off your body completely.   Pat yourself dry with a clean, freshly-laundered towel.  Dress in clean, freshly laundered night clothes.    Be sure to sleep with clean, freshly laundered sheets.  Day 5:  Last shower is the morning before surgery: Follow above Instructions.    NOTE:    *Hair extensions should be removed    *Keep CHG soap out of eyes and ear canals   *DO NOT wash with regular soap on your body after you have used the CHG        soap solution  *DO NOT apply powders, lotions, or perfume.  *Deodorant may be used days 1-4, BUT NOT the day of surgery    Who should I contact if  I have any questions regarding the use of CHG soap?  Call the [facility], Preadmission Testing at [phone] if you have any questions.              Patient Information: Pre-Operative Infection Prevention Measures     Why did I have my nose, under my arms and groin swabbed?  The purpose of the swab is to identify Staphylococcus aureus inside your nose or on your skin.  The swab was sent to the laboratory for culture.  A positive swab/culture for Staphylococcus aureus is called colonization or carriage.      What is Staphylococcus aureus?  Staphylococcus aureus, also known as “staph”, is a germ found on the skin or in the nose of healthy people.  Sometimes Staphylococcus aureus can get into the body and cause an infection.  This can be minor (such as pimples, boils or other skin problems).  It might also be serious (such as blood infection, pneumonia or a surgical site infection).    What is Staphylococcus aureus colonization or carriage?  Colonization or carriage means that a person has the germ but is not sick from it.  These bacteria can be spread on the hands or when breathing or sneezing.    How is Staphylococcus aureus spread?  It is most often spread by close contact with a person or item that carries it.    What happens if my culture is positive for Staphylococcus aureus?  Your doctor/medical team will use this information to guide any antibiotic treatment which may be necessary.  Regardless of the culture results, we will clean the inside of your nose with a betadine swab just before you have your surgery.      Will I get an infection if I have Staphylococcus aureus in my nose or on my skin?  Anyone can get an infection with Staphylococcus aureus.  However, the best way to reduce your risk of infection is to follow the instructions provided to you for the use of your CHG soap and dental rinse.        Who should I contact if I have any questions?  Call the [facility]  [facility], Preadmission Testing at [phone] if you have any questions.

## 2024-08-27 NOTE — CPM/PAT H&P
"[facility]/PAT Evaluation       Name: Radha Garcia (Radha Garcia \"Katie\")  /Age: 1960/64 y.o.       Date of Consult: 24    Referring Provider: Dr. Shell    Surgery, Date, and Length: Right Foot Calcaneus Fracture Open Reduction Internal Fixation - Right , 24, 120MIN    Radha Garcia is a 64 year-old female who presents to the [facility] for perioperative risk assessment prior to surgery.    Patient presents with a primary diagnosis of right heel pain. The patient was going down the stairs and hit her right heel on cement after chasing after her dog. She sought emergent medical attention 24. Xrays show acute calcaneal fracture extending from the calcaneal tuberosity to the sinus tarsi with distraction of the posterior calcaneus by 1 cm. CT right ankle also obtained which showed \"comminuted tongue-type intra-articular fracture of the  calcaneus with significant comminution of fracture fragments and depression of the articular surface\". Pt to have ORIF as planned.     This note was created in part upon personal review of patient's medical records.      Patient is scheduled to have Right Foot Calcaneus Fracture Open Reduction Internal Fixation - Right    Pt does mention she woke up during exploratory laparoscopy and required additional anesthesia; which then caused prolonged sedation after surgery was complete.    Pt denies any past history of anesthetic complications such as PONV, awareness, prolonged sedation, dental damage, aspiration, cardiac arrest, difficult intubation, difficult I.V. access or unexpected hospital admissions.  NO malignant hyperthermia and or pseudocholinesterase deficiency.  No history of blood transfusions     The patient is not a [employer] and will accept blood and blood products if medically indicated.   Type and screen NOT sent.     Past Medical History:   Diagnosis Date    Breast cancer in female (Multi)     Migraine headache     Right calcaneal fracture  "       Past Surgical History:   Procedure Laterality Date    BREAST LUMPECTOMY  06/03/2016    Breast Surgery Lumpectomy    OTHER SURGICAL HISTORY  01/20/2022    Sinus surgery    TONSILLECTOMY  06/03/2016    Tonsillectomy With Adenoidectomy    TUBAL LIGATION  06/03/2016    Tubal Ligation       Patient  has no history on file for sexual activity.    Family History   Adopted: Yes   Problem Relation Name Age of Onset    Heart defect Brother      Coronary artery disease Brother      Heart attack Brother      Heart attack Maternal Grandmother      Stroke Maternal Grandmother       Social History     Socioeconomic History    Marital status:      Spouse name: Not on file    Number of children: Not on file    Years of education: Not on file    Highest education level: Not on file   Occupational History    Not on file   Tobacco Use    Smoking status: Every Day     Current packs/day: 1.00     Average packs/day: 1 pack/day for 47.7 years (47.7 ttl pk-yrs)     Types: Cigarettes     Start date: 1977    Smokeless tobacco: Never   Vaping Use    Vaping status: Never Used   Substance and Sexual Activity    Alcohol use: Not Currently    Drug use: Yes     Types: Marijuana     Comment: once a week    Sexual activity: Not on file   Other Topics Concern    Not on file   Social History Narrative    Not on file     Social Determinants of Health     Financial Resource Strain: Not on file   Food Insecurity: Not on file   Transportation Needs: Not on file   Physical Activity: Not on file   Stress: Not on file   Social Connections: Not on file   Intimate Partner Violence: Not on file   Housing Stability: Not on file        No Known Allergies      Current Outpatient Medications:     docusate sodium 250 mg capsule, Take by mouth if needed., Disp: , Rfl:     fluticasone (Flonase) 50 mcg/actuation nasal spray, Administer 1 spray into each nostril if needed for rhinitis. Shake gently. Before first use, prime pump. After use, clean tip and  replace cap., Disp: , Rfl:     oxyCODONE (Roxicodone) 5 mg immediate release tablet, Take 1 tablet (5 mg) by mouth every 6 hours if needed for severe pain (7 - 10) for up to 3 days., Disp: 12 tablet, Rfl: 0    acetaminophen (Tylenol 8 HOUR) 650 mg ER tablet, Take 1 tablet (650 mg) by mouth every 8 hours if needed for mild pain (1 - 3). Do not crush, chew, or split., Disp: , Rfl:     chlorhexidine (Peridex) 0.12 % solution, Swish for 30 seconds and spit 15mL of solution the night before and morning of surgery (Patient not taking: Reported on 8/27/2024), Disp: 475 mL, Rfl: 0         PAT ROS:   Constitutional:    no fever   no chills   no unexpected weight change  Neuro/Psych:    no numbness   no weakness   no light-headedness   no confusion  Eyes:    no discharge   no pain   no vision loss   no diplopia   no visual disturbance  Ears:    no ear pain   no hearing loss   no tinnitus  Nose:    no nasal discharge   no sinus congestion   no epistaxis  Mouth:    no dental issues   no mouth pain   no oral bleeding   no mouth lesions  Throat:    no throat pain   no dysphagia  Neck:    no neck pain   no neck stiffness  Cardio:    Functional 4 Mets. Patient denies SOB walking up 2 flights of stairs   Chasing / walking dog; cooking, cleaning , grocery shopping    no chest pain   no palpitations   no peripheral edema   no dyspnea   no JEFFREY  Respiratory:    no cough   no wheezing   no hemoptysis   no shortness of breath  Endocrine:    no cold intolerance   no heat intolerance  GI:    no abdominal distention   no abdominal pain   constipation (docusate)   no diarrhea   no nausea   no vomiting   no blood in stool  :    no difficulty urinating   no dysuria   no oliguria  Musculoskeletal:    arthralgias (right hip, right ankle, right heel)   no myalgias   no decreased ROM  Hematologic:    bruises/bleeds easily   no excessive bleeding   no history of blood transfusion   no blood clots  Skin:   Ecchymosis of right hip, right foot   no  skin changes   no sores/wound   no rash      Physical Exam  Constitutional:       General: She is not in acute distress.     Appearance: Normal appearance. She is not ill-appearing, toxic-appearing or diaphoretic.   HENT:      Head: Normocephalic and atraumatic.      Nose: Nose normal. No rhinorrhea.      Mouth/Throat:      Pharynx: No oropharyngeal exudate.   Eyes:      Extraocular Movements: Extraocular movements intact.      Conjunctiva/sclera: Conjunctivae normal.   Cardiovascular:      Rate and Rhythm: Normal rate and regular rhythm.      Heart sounds: No murmur heard.     No friction rub. No gallop.      Comments: Functional 4 Mets. Patient denies SOB walking up 2 flights of stairs     Pulmonary:      Effort: Pulmonary effort is normal. No respiratory distress.      Breath sounds: Normal breath sounds. No stridor. No wheezing or rhonchi.   Abdominal:      General: Bowel sounds are normal. There is no distension.      Palpations: Abdomen is soft. There is no mass.      Tenderness: There is no abdominal tenderness. There is no guarding or rebound.      Hernia: No hernia is present.   Musculoskeletal:         General: Swelling (right foot (unable to fully assess due to cast in place)) and tenderness (right hindfoot pain; right foot in cast) present. No deformity or signs of injury. Normal range of motion.      Cervical back: Normal range of motion and neck supple. No rigidity or tenderness.   Skin:     General: Skin is warm and dry.      Coloration: Skin is not jaundiced or pale.      Findings: No bruising, erythema, lesion or rash.   Neurological:      General: No focal deficit present.      Mental Status: She is alert and oriented to person, place, and time.      Cranial Nerves: No cranial nerve deficit.      Sensory: No sensory deficit.      Motor: No weakness.      Coordination: Coordination normal.   Psychiatric:         Mood and Affect: Mood normal.         Behavior: Behavior normal.          PAT AIRWAY:  "  Airway:     Mallampati::  II    Neck ROM::  Full   Few missing teeth bottom and upper left; no loose tooth      Visit Vitals  /76   Pulse 85   Temp 36.1 °C (97 °F)   Ht 1.66 m (5' 5.35\")   Wt 56.7 kg (125 lb)   SpO2 98%   BMI 20.58 kg/m²   Smoking Status Every Day   BSA 1.62 m²      LABS:  Lab Results   Component Value Date    WBC 8.8 08/27/2024    HGB 12.8 08/27/2024    HCT 37.4 08/27/2024    MCV 98 08/27/2024     08/27/2024      Lab Results   Component Value Date    GLUCOSE 112 (H) 08/27/2024    CALCIUM 9.2 08/27/2024     08/27/2024    K 4.9 08/27/2024    CO2 26 08/27/2024     08/27/2024    BUN 10 08/27/2024    CREATININE 0.81 08/27/2024        Assessment and Plan:     Patient is a 64-year-old female scheduled for a Right Foot Calcaneus Fracture Open Reduction Internal Fixation - Right with Dr. Shell on .  Patient has no active cardiac symptoms.   Patient denies any chest pain, tightness, heaviness, pressure, radiating pain, palpitations, irregular heartbeats, lightheadedness, cough, congestion, shortness of breath, JEFFREY, PND, near syncope, weight loss or gain.     RCRI  0  , 3.9 % Risk of MACE    Pulmonary:  Nicotine dependence - Patient has history of nicotine dependency. Smoking cessation education was provided to the patient.Cessation encouraged. - Physiological and physical aspects of tobacco addiction as well as strategies for quitting were discussed. - Counseling was given focusing on the harmful effects of this addiction especially given the patient's medical condition(s) which will be worsened because of the chemicals in tobacco       Hematology:  Patient instructed to ambulate as soon as possible postoperatively to decrease thromboembolic risk.   Initiate mechanical DVT prophylaxis as soon as possible and initiate chemical prophylaxis when deemed safe from a bleeding standpoint post surgery.     LABS: CBC , BMP and MRSA ordered. Lab results reviewed and unremarkable with " exception of slightly elevated nonfasting blood glucose of 112mg/dL in non-diabetic.      STOP BANG: >50 = 1    Caprini: 4    Risk assessment complete.  Patient is scheduled for a low to intermediate surgical risk procedure.        Preoperative medication instructions were provided and reviewed with the patient.  Any additional testing or evaluation was explained to the patient.  Nothing by mouth instructions were discussed and patient's questions were answered prior to conclusion to this encounter.  Patient verbalized understanding of preoperative instructions given in preadmission testing; discharge instructions available in EMR.    This note was dictated by a speech recognition.  Minor errors may have been detected in a speech recognition.

## 2024-08-28 ENCOUNTER — ANESTHESIA EVENT (OUTPATIENT)
Dept: OPERATING ROOM | Facility: HOSPITAL | Age: 64
End: 2024-08-28

## 2024-08-29 ENCOUNTER — ANESTHESIA (OUTPATIENT)
Dept: OPERATING ROOM | Facility: HOSPITAL | Age: 64
End: 2024-08-29

## 2024-08-29 ENCOUNTER — HOSPITAL ENCOUNTER (OUTPATIENT)
Facility: HOSPITAL | Age: 64
Setting detail: OUTPATIENT SURGERY
Discharge: HOME | End: 2024-08-29
Attending: ORTHOPAEDIC SURGERY | Admitting: ORTHOPAEDIC SURGERY

## 2024-08-29 ENCOUNTER — APPOINTMENT (OUTPATIENT)
Dept: RADIOLOGY | Facility: HOSPITAL | Age: 64
End: 2024-08-29

## 2024-08-29 VITALS
OXYGEN SATURATION: 95 % | HEIGHT: 65 IN | TEMPERATURE: 97.3 F | HEART RATE: 90 BPM | RESPIRATION RATE: 18 BRPM | BODY MASS INDEX: 20.83 KG/M2 | SYSTOLIC BLOOD PRESSURE: 116 MMHG | WEIGHT: 125 LBS | DIASTOLIC BLOOD PRESSURE: 65 MMHG

## 2024-08-29 DIAGNOSIS — S92.041A CLOSED DISPLACED FRACTURE OF TUBEROSITY OF RIGHT CALCANEUS, UNSPECIFIED FRACTURE MORPHOLOGY, INITIAL ENCOUNTER: ICD-10-CM

## 2024-08-29 DIAGNOSIS — S92.041A: Primary | ICD-10-CM

## 2024-08-29 DIAGNOSIS — G89.18 ACUTE POST-OPERATIVE PAIN: ICD-10-CM

## 2024-08-29 PROBLEM — F12.20 MARIJUANA DEPENDENCE (MULTI): Status: ACTIVE | Noted: 2024-08-29

## 2024-08-29 PROBLEM — F17.200 NICOTINE DEPENDENCE WITH CURRENT USE: Status: ACTIVE | Noted: 2024-08-29

## 2024-08-29 PROBLEM — T88.53XA AWARENESS UNDER ANESTHESIA: Status: ACTIVE | Noted: 2024-08-29

## 2024-08-29 LAB — STAPHYLOCOCCUS SPEC CULT: NORMAL

## 2024-08-29 PROCEDURE — 3700000001 HC GENERAL ANESTHESIA TIME - INITIAL BASE CHARGE: Performed by: ORTHOPAEDIC SURGERY

## 2024-08-29 PROCEDURE — 2500000005 HC RX 250 GENERAL PHARMACY W/O HCPCS: Performed by: ORTHOPAEDIC SURGERY

## 2024-08-29 PROCEDURE — 7100000002 HC RECOVERY ROOM TIME - EACH INCREMENTAL 1 MINUTE: Performed by: ORTHOPAEDIC SURGERY

## 2024-08-29 PROCEDURE — 7100000001 HC RECOVERY ROOM TIME - INITIAL BASE CHARGE: Performed by: ORTHOPAEDIC SURGERY

## 2024-08-29 PROCEDURE — 3600000009 HC OR TIME - EACH INCREMENTAL 1 MINUTE - PROCEDURE LEVEL FOUR: Performed by: ORTHOPAEDIC SURGERY

## 2024-08-29 PROCEDURE — 7100000010 HC PHASE TWO TIME - EACH INCREMENTAL 1 MINUTE: Performed by: ORTHOPAEDIC SURGERY

## 2024-08-29 PROCEDURE — C1769 GUIDE WIRE: HCPCS | Performed by: ORTHOPAEDIC SURGERY

## 2024-08-29 PROCEDURE — 2500000005 HC RX 250 GENERAL PHARMACY W/O HCPCS: Performed by: NURSE ANESTHETIST, CERTIFIED REGISTERED

## 2024-08-29 PROCEDURE — 3700000002 HC GENERAL ANESTHESIA TIME - EACH INCREMENTAL 1 MINUTE: Performed by: ORTHOPAEDIC SURGERY

## 2024-08-29 PROCEDURE — C1713 ANCHOR/SCREW BN/BN,TIS/BN: HCPCS | Performed by: ORTHOPAEDIC SURGERY

## 2024-08-29 PROCEDURE — 2500000004 HC RX 250 GENERAL PHARMACY W/ HCPCS (ALT 636 FOR OP/ED): Performed by: ANESTHESIOLOGY

## 2024-08-29 PROCEDURE — 7100000009 HC PHASE TWO TIME - INITIAL BASE CHARGE: Performed by: ORTHOPAEDIC SURGERY

## 2024-08-29 PROCEDURE — 2500000004 HC RX 250 GENERAL PHARMACY W/ HCPCS (ALT 636 FOR OP/ED): Performed by: NURSE ANESTHETIST, CERTIFIED REGISTERED

## 2024-08-29 PROCEDURE — 28415 OPTX CALCANEAL FRACTURE: CPT | Performed by: ORTHOPAEDIC SURGERY

## 2024-08-29 PROCEDURE — 2720000007 HC OR 272 NO HCPCS: Performed by: ORTHOPAEDIC SURGERY

## 2024-08-29 PROCEDURE — 2780000003 HC OR 278 NO HCPCS: Performed by: ORTHOPAEDIC SURGERY

## 2024-08-29 PROCEDURE — 3600000004 HC OR TIME - INITIAL BASE CHARGE - PROCEDURE LEVEL FOUR: Performed by: ORTHOPAEDIC SURGERY

## 2024-08-29 PROCEDURE — 2500000004 HC RX 250 GENERAL PHARMACY W/ HCPCS (ALT 636 FOR OP/ED): Performed by: ORTHOPAEDIC SURGERY

## 2024-08-29 PROCEDURE — 76000 FLUOROSCOPY <1 HR PHYS/QHP: CPT | Mod: RT

## 2024-08-29 DEVICE — IMPLANTABLE DEVICE: Type: IMPLANTABLE DEVICE | Site: FOOT | Status: FUNCTIONAL

## 2024-08-29 RX ORDER — NAPROXEN SODIUM 220 MG/1
81 TABLET, FILM COATED ORAL 2 TIMES DAILY
Qty: 28 TABLET | Refills: 0 | Status: SHIPPED | OUTPATIENT
Start: 2024-08-29 | End: 2024-09-12

## 2024-08-29 RX ORDER — DROPERIDOL 2.5 MG/ML
0.62 INJECTION, SOLUTION INTRAMUSCULAR; INTRAVENOUS ONCE AS NEEDED
Status: DISCONTINUED | OUTPATIENT
Start: 2024-08-29 | End: 2024-08-29 | Stop reason: HOSPADM

## 2024-08-29 RX ORDER — SODIUM CHLORIDE, SODIUM LACTATE, POTASSIUM CHLORIDE, CALCIUM CHLORIDE 600; 310; 30; 20 MG/100ML; MG/100ML; MG/100ML; MG/100ML
20 INJECTION, SOLUTION INTRAVENOUS CONTINUOUS
Status: DISCONTINUED | OUTPATIENT
Start: 2024-08-29 | End: 2024-08-29 | Stop reason: HOSPADM

## 2024-08-29 RX ORDER — CEFAZOLIN SODIUM 2 G/100ML
2 INJECTION, SOLUTION INTRAVENOUS ONCE
Status: DISCONTINUED | OUTPATIENT
Start: 2024-08-29 | End: 2024-08-29 | Stop reason: HOSPADM

## 2024-08-29 RX ORDER — SODIUM CHLORIDE, SODIUM LACTATE, POTASSIUM CHLORIDE, CALCIUM CHLORIDE 600; 310; 30; 20 MG/100ML; MG/100ML; MG/100ML; MG/100ML
20 INJECTION, SOLUTION INTRAVENOUS CONTINUOUS
Status: DISCONTINUED | OUTPATIENT
Start: 2024-08-29 | End: 2024-08-29 | Stop reason: SDUPTHER

## 2024-08-29 RX ORDER — PHENYLEPHRINE HYDROCHLORIDE 10 MG/ML
INJECTION INTRAVENOUS AS NEEDED
Status: DISCONTINUED | OUTPATIENT
Start: 2024-08-29 | End: 2024-08-29

## 2024-08-29 RX ORDER — ONDANSETRON 4 MG/1
4 TABLET, FILM COATED ORAL EVERY 8 HOURS PRN
Qty: 30 TABLET | Refills: 0 | Status: SHIPPED | OUTPATIENT
Start: 2024-08-29 | End: 2024-09-28

## 2024-08-29 RX ORDER — PROPOFOL 10 MG/ML
INJECTION, EMULSION INTRAVENOUS AS NEEDED
Status: DISCONTINUED | OUTPATIENT
Start: 2024-08-29 | End: 2024-08-29

## 2024-08-29 RX ORDER — HYDROMORPHONE HYDROCHLORIDE 1 MG/ML
INJECTION, SOLUTION INTRAMUSCULAR; INTRAVENOUS; SUBCUTANEOUS AS NEEDED
Status: DISCONTINUED | OUTPATIENT
Start: 2024-08-29 | End: 2024-08-29

## 2024-08-29 RX ORDER — HYDRALAZINE HYDROCHLORIDE 20 MG/ML
5 INJECTION INTRAMUSCULAR; INTRAVENOUS EVERY 30 MIN PRN
Status: DISCONTINUED | OUTPATIENT
Start: 2024-08-29 | End: 2024-08-29 | Stop reason: HOSPADM

## 2024-08-29 RX ORDER — FENTANYL CITRATE 50 UG/ML
INJECTION, SOLUTION INTRAMUSCULAR; INTRAVENOUS AS NEEDED
Status: DISCONTINUED | OUTPATIENT
Start: 2024-08-29 | End: 2024-08-29

## 2024-08-29 RX ORDER — PROCHLORPERAZINE EDISYLATE 5 MG/ML
5 INJECTION INTRAMUSCULAR; INTRAVENOUS ONCE AS NEEDED
Status: DISCONTINUED | OUTPATIENT
Start: 2024-08-29 | End: 2024-08-29 | Stop reason: HOSPADM

## 2024-08-29 RX ORDER — ASPIRIN 81 MG/1
81 TABLET ORAL 2 TIMES DAILY
Qty: 60 TABLET | Refills: 0 | Status: SHIPPED | OUTPATIENT
Start: 2024-08-29 | End: 2024-09-28

## 2024-08-29 RX ORDER — ONDANSETRON HYDROCHLORIDE 2 MG/ML
4 INJECTION, SOLUTION INTRAVENOUS ONCE AS NEEDED
Status: DISCONTINUED | OUTPATIENT
Start: 2024-08-29 | End: 2024-08-29 | Stop reason: HOSPADM

## 2024-08-29 RX ORDER — OXYCODONE HYDROCHLORIDE 5 MG/1
5 TABLET ORAL EVERY 6 HOURS PRN
Qty: 28 TABLET | Refills: 0 | Status: SHIPPED | OUTPATIENT
Start: 2024-08-29 | End: 2024-09-05

## 2024-08-29 RX ORDER — SODIUM CHLORIDE, SODIUM LACTATE, POTASSIUM CHLORIDE, CALCIUM CHLORIDE 600; 310; 30; 20 MG/100ML; MG/100ML; MG/100ML; MG/100ML
100 INJECTION, SOLUTION INTRAVENOUS CONTINUOUS
Status: DISCONTINUED | OUTPATIENT
Start: 2024-08-29 | End: 2024-08-29 | Stop reason: HOSPADM

## 2024-08-29 RX ORDER — OXYCODONE HYDROCHLORIDE 5 MG/1
5 TABLET ORAL
Status: DISCONTINUED | OUTPATIENT
Start: 2024-08-29 | End: 2024-08-29 | Stop reason: HOSPADM

## 2024-08-29 RX ORDER — PANTOPRAZOLE SODIUM 40 MG/1
40 TABLET, DELAYED RELEASE ORAL
Qty: 30 TABLET | Refills: 0 | Status: SHIPPED | OUTPATIENT
Start: 2024-08-29 | End: 2024-09-28

## 2024-08-29 RX ORDER — SODIUM CHLORIDE 0.9 G/100ML
IRRIGANT IRRIGATION AS NEEDED
Status: DISCONTINUED | OUTPATIENT
Start: 2024-08-29 | End: 2024-08-29 | Stop reason: HOSPADM

## 2024-08-29 RX ORDER — MIDAZOLAM HYDROCHLORIDE 1 MG/ML
INJECTION, SOLUTION INTRAMUSCULAR; INTRAVENOUS AS NEEDED
Status: DISCONTINUED | OUTPATIENT
Start: 2024-08-29 | End: 2024-08-29

## 2024-08-29 RX ORDER — LIDOCAINE HYDROCHLORIDE 20 MG/ML
INJECTION, SOLUTION EPIDURAL; INFILTRATION; INTRACAUDAL; PERINEURAL AS NEEDED
Status: DISCONTINUED | OUTPATIENT
Start: 2024-08-29 | End: 2024-08-29

## 2024-08-29 RX ORDER — OXYCODONE HYDROCHLORIDE 5 MG/1
5 TABLET ORAL EVERY 6 HOURS PRN
COMMUNITY

## 2024-08-29 RX ORDER — NORETHINDRONE AND ETHINYL ESTRADIOL 0.5-0.035
KIT ORAL AS NEEDED
Status: DISCONTINUED | OUTPATIENT
Start: 2024-08-29 | End: 2024-08-29

## 2024-08-29 RX ORDER — DOCUSATE SODIUM 100 MG/1
100 CAPSULE, LIQUID FILLED ORAL 2 TIMES DAILY
Qty: 60 CAPSULE | Refills: 0 | Status: SHIPPED | OUTPATIENT
Start: 2024-08-29 | End: 2024-09-28

## 2024-08-29 RX ORDER — CEFAZOLIN 1 G/1
INJECTION, POWDER, FOR SOLUTION INTRAVENOUS AS NEEDED
Status: DISCONTINUED | OUTPATIENT
Start: 2024-08-29 | End: 2024-08-29

## 2024-08-29 RX ORDER — ONDANSETRON HYDROCHLORIDE 2 MG/ML
INJECTION, SOLUTION INTRAVENOUS AS NEEDED
Status: DISCONTINUED | OUTPATIENT
Start: 2024-08-29 | End: 2024-08-29

## 2024-08-29 RX ADMIN — SODIUM CHLORIDE, POTASSIUM CHLORIDE, SODIUM LACTATE AND CALCIUM CHLORIDE 20 ML/HR: 600; 310; 30; 20 INJECTION, SOLUTION INTRAVENOUS at 08:38

## 2024-08-29 SDOH — HEALTH STABILITY: MENTAL HEALTH: CURRENT SMOKER: 1

## 2024-08-29 ASSESSMENT — PAIN SCALES - GENERAL
PAINLEVEL_OUTOF10: 0 - NO PAIN
PAINLEVEL_OUTOF10: 3
PAINLEVEL_OUTOF10: 0 - NO PAIN

## 2024-08-29 ASSESSMENT — PAIN - FUNCTIONAL ASSESSMENT
PAIN_FUNCTIONAL_ASSESSMENT: 0-10

## 2024-08-29 NOTE — OP NOTE
"Right Foot Calcaneus Fracture Open Reduction Internal Fixation (R) Operative Note     Date: 2024  OR Location: [facility] A OR    Name: Radha Garcia \"Katie\", : 1960, Age: 64 y.o., MRN: [id number], Sex: female    Diagnosis  Pre-op Diagnosis      * Closed displaced fracture of tuberosity of right calcaneus, unspecified fracture morphology, initial encounter [S92.929A] Post-op Diagnosis     * Closed displaced fracture of tuberosity of right calcaneus, unspecified fracture morphology, initial encounter [S92.041A]     Procedures  Right Foot Calcaneus Fracture Open Reduction Internal Fixation  67970 - MO OPEN TREATMENT CALCANEAL FRACTURE      Surgeons      * Nam Shell - Primary    Resident/Fellow/Other Assistant:  Surgeons and Role:     * Alok Bray MD - Resident - Assisting    Procedure Summary  Anesthesia: Regional, General  ASA: II  Anesthesia Staff: Anesthesiologist: Chrissy Houston MD  CRNA: KWABENA Monaco-PAPO, MUSTAPHA  Estimated Blood Loss: 5mL  Intra-op Medications:   Administrations occurring from 1000 to 1200 on 24:   Medication Name Total Dose   sodium chloride 0.9 % irrigation solution 1,000 mL   lactated Ringer's infusion 7.67 mL              Anesthesia Record               Intraprocedure I/O Totals          Intake    lactated Ringer's infusion 1300.00 mL    Total Intake 1300 mL          Specimen: No specimens collected     Staff:   Circulator: Grace  Scrub Person: Tiffanie  Relief Scrub: Belem  Relief Circulator: Linh         Drains and/or Catheters: * None in log *    Tourniquet Times:   * Missing tourniquet times found for documented tourniquets in lo *     Implants:  Implants       Type Name Action Serial No.      Screw SCREW, LOW PROFILE, ALEX, 6.7 X 45MM, FT - SN/A - [id number] Implanted N/A     Screw SCREW, LOW PROFILE, ALEX, 6.7 X 40MM, FT - SN/A - [id number] Implanted N/A              Findings: Displaced tongue type right calcaneus fracture    Indications: Katie " Conchis Garcia is an 64 y.o. female who is having surgery for Closed displaced fracture of tuberosity of right calcaneus, unspecified fracture morphology, initial encounter [S92.041A].  Displaced right locators fracture.  Here for surgical repair    The patient was seen in the preoperative area. The risks, benefits, complications, treatment options, non-operative alternatives, expected recovery and outcomes were discussed with the patient. The possibilities of reaction to medication, pulmonary aspiration, injury to surrounding structures, bleeding, recurrent infection, the need for additional procedures, failure to diagnose a condition, and creating a complication requiring transfusion or operation were discussed with the patient. The patient concurred with the proposed plan, giving informed consent.  The site of surgery was properly noted/marked if necessary per policy. The patient has been actively warmed in preoperative area. Preoperative antibiotics have been ordered and given within 1 hours of incision. Venous thrombosis prophylaxis are not indicated.    Procedure Details:   Preop DX: Displaced right calcaneus tongue-type fracture  Postop DX: Same  Procedure: ORIF right calcaneus fracture  Surgeon: Nam Shell MD  Asst: Alok Bray MD  Anesthesia: General And regional  Clinical Note: 64-year-old female with a displaced right calcaneal tongue type fracture.  Here for surgical repair.  Daily smoker.  Understands the risk of surgery including bleeding, infection, nonunion, malunion, possibly further surgery or bracing.  No signs these risk wished to proceed.  Procedure Note: Patient brought to operating room after regional anesthesia.  Timeout performed.  Antibiotics given IV.  General anesthesia given.  Placed in a sloppy lateral position with beanbag.  All bony prominences padded well.  Leg was exsanguinated and thigh tourniquet inflated to 325 mm marie.  C-arm imaging used throughout the case.  Stab incision  made along the superior border the calcaneal body.  Stab incision on the inferior lateral border of the calcaneal body.  Sinus Tarsi incision was made.  Blunt dissection down to the sinus Tarsi.  Hematoma was expressed.  Posterior facet fragment was identified.  Small incision made transversely at the posterior calcaneus at the fracture site.  Small Crowe was placed in the fracture site from the posterior aspect and another Crowe was used to elevate the posterior facet through the sinus Tarsi incision.  Bone clamps then placed to the superior and inferior incisions.  Fracture was reduced with secondary for plantarflexion of the foot and anterior translation of the superior fragment with good alignment at the posterior facet.  Clamp was held in place with anatomic reduction on lateral and axial view.  Fracture was fixed with two 6.7 mm cannulated screws fully threaded from superior to inferior with good fixation.  Clamp was removed.  Ankle was taken through range of motion and fracture fragment was reduced.  Wounds were irrigated.  Closed in layers with nylon in the skin.  Placed in a posterior splint sugar-tong.    Complications:  None; patient tolerated the procedure well.    Disposition: PACU - hemodynamically stable.  Condition: stable         Additional Details: none    Attending Attestation:     Nam Shell  Phone Number: [phone]

## 2024-08-29 NOTE — ANESTHESIA PREPROCEDURE EVALUATION
"Patient: Radha Garcia \"Katie\"    Procedure Information       Date/Time: 08/29/24 1000    Procedure: Right Foot Calcaneus Fracture Open Reduction Internal Fixation (Right: Foot)    Location: [facility] A OR  / [facility]    Surgeons: Nam Shell MD                                                         Pre- Anesthesia Evaluation                                            Radha Garcia \"Katie\" is a 64 y.o. female who presents for the above mentioned procedure due to Closed displaced fracture of tuberosity of right calcaneus, unspecified fracture morphology, initial encounter [S92.041A]    Past Medical History:   Diagnosis Date    Breast cancer in female (Multi)     Migraine headache     Right calcaneal fracture      Past Surgical History:   Procedure Laterality Date    BREAST LUMPECTOMY  06/03/2016    Breast Surgery Lumpectomy    OTHER SURGICAL HISTORY  01/20/2022    Sinus surgery    TONSILLECTOMY  06/03/2016    Tonsillectomy With Adenoidectomy    TUBAL LIGATION  06/03/2016    Tubal Ligation     Social History   She reports that she has been smoking cigarettes. She started smoking about 47 years ago. She has a 47.7 pack-year smoking history. She has never used smokeless tobacco. She reports that she does not currently use alcohol. She reports current drug use. Drug: Marijuana.    Allergies and Medications   No Active Allergies  Current Outpatient Medications   Medication Instructions    acetaminophen (TYLENOL 8 HOUR) 650 mg, oral, Every 8 hours PRN, Do not crush, chew, or split.    aspirin 81 mg, oral, 2 times daily    chlorhexidine (Peridex) 0.12 % solution Swish for 30 seconds and spit 15mL of solution the night before and morning of surgery    docusate sodium (COLACE) 100 mg, oral, 2 times daily    docusate sodium 250 mg capsule oral, As needed    fluticasone (Flonase) 50 mcg/actuation nasal spray 1 spray, Each Nostril, As needed, Shake gently. Before first use, prime pump. After use, clean tip and " "replace cap.    ondansetron (ZOFRAN) 4 mg, oral, Every 8 hours PRN    oxyCODONE (ROXICODONE) 5 mg, oral, Every 6 hours PRN    oxyCODONE (ROXICODONE) 5 mg, oral, Every 6 hours PRN    pantoprazole (PROTONIX) 40 mg, oral, Daily before breakfast, Do not crush, chew, or split.       Recent Labs     08/27/24  1251   WBC 8.8   HGB 12.8   HCT 37.4      MCV 98     Recent Labs     08/27/24  1251   EGFR 82   ANIONGAP 13   BUN 10   CREATININE 0.81      K 4.9      CO2 26   GLUCOSE 112*     Lab Results   Component Value Date    STAPHMRSASCR No Staphylococcus aureus isolated 08/27/2024         Cardiac Imaging   Cardiac Scoring:  Coronary artery calcium score of  0*.    Visit Vitals  /82   Pulse 100   Temp 37 °C (98.6 °F) (Temporal)   Resp 16   Ht 1.651 m (5' 5\")   Wt 56.7 kg (125 lb)   LMP  (LMP Unknown) Comment: not having periods   SpO2 97%   BMI 20.80 kg/m²   Smoking Status Every Day   BSA 1.61 m²            8/29/2024     8:30 AM 8/27/2024    11:59 AM 8/25/2024     6:36 PM 8/25/2024     3:00 PM 8/25/2024     2:00 PM 8/25/2024    12:00 PM 8/25/2024    11:44 AM   BP REVIEW   BP (ultimate) 142/82 123/76 134/71 126/88 129/64 143/81 146/65           Relevant Problems   Anesthesia   (+) Awareness under anesthesia      Neuro   (+) Migraines      Tobacco   (+) Nicotine dependence with current use      Mental Health   (+) Marijuana dependence (Multi)       Clinical information reviewed:   Tobacco  Allergies  Meds  Problems  Med Hx  Surg Hx  OB Status    Fam Hx  Soc Hx        NPO Detail:  NPO/Void Status  Carbohydrate Drink Given Prior to Surgery? : N  Date of Last Liquid: 08/29/24  Time of Last Liquid: 0645  Date of Last Solid: 08/28/24  Time of Last Solid: 1800  Last Intake Type: Clear fluids  Time of Last Void: 0810         Physical Exam    Airway  Mallampati: II  TM distance: >3 FB  Neck ROM: full     Cardiovascular   Rhythm: regular  Rate: normal     Dental - normal exam  Comments: Poor dentition "   Pulmonary   Comments: Normal RR  Non-labored respiration    Abdominal            Anesthesia Plan    History of general anesthesia?: yes  History of complications of general anesthesia?: yes    ASA 2     general and regional   (LMA with standard ASA monitoring)  The patient is a current smoker.  Patient was previously instructed to abstain from smoking on day of procedure.  Patient smoked on day of procedure.    intravenous induction   Anesthetic plan and risks discussed with patient.    Plan discussed with CRNA and CAA.    Smoking cessation strongly advised. Cessation and maintenance measures discussed.

## 2024-08-29 NOTE — ANESTHESIA POSTPROCEDURE EVALUATION
"Patient: Radha Garcia \"Katie\"    Procedure Summary       Date: 08/29/24 Room / Location: [facility] OR 12 / [facility] OR    Anesthesia Start: 0955 Anesthesia Stop: 1112    Procedure: Right Foot Calcaneus Fracture Open Reduction Internal Fixation (Right: Foot) Diagnosis:       Closed displaced fracture of tuberosity of right calcaneus, unspecified fracture morphology, initial encounter      (Closed displaced fracture of tuberosity of right calcaneus, unspecified fracture morphology, initial encounter [S92.041A])    Surgeons: Nam Shell MD Responsible Provider: Chrissy Houston MD    Anesthesia Type: general, regional ASA Status: 2            Anesthesia Type: general, regional    Vitals Value Taken Time   /60 08/29/24 1111   Temp 36 08/29/24 1112   Pulse 103 08/29/24 1112   Resp 21 08/29/24 1112   SpO2 94 % 08/29/24 1112   Vitals shown include unfiled device data.    Anesthesia Post Evaluation    Patient location during evaluation: PACU  Patient participation: complete - patient participated  Level of consciousness: awake and alert  Pain management: adequate  Airway patency: patent  Cardiovascular status: acceptable  Respiratory status: acceptable and spontaneous ventilation  Hydration status: acceptable  Postoperative Nausea and Vomiting: none      No notable events documented.    "

## 2024-08-29 NOTE — PERIOPERATIVE NURSING NOTE
1108 - Patient arrived to [place] after procedure with Anesthesia and procedure RN, procedure discussed, plan reviewed, VSS.     1115 - patient tolerating small sips of PO fluid at this time.     1124 - patient spouse updated via text messaging at this time.     1130 - patient tolerating snacks at this time.     1150 - phase I care complete    1151 - phase II    1157 - patient spouse at bedside     1159 - handoff report given to EDMAR Tom RN

## 2024-08-29 NOTE — ANESTHESIA PROCEDURE NOTES
Airway  Date/Time: 8/29/2024 10:02 AM  Urgency: elective      Staffing  Performed: CRNA   Authorized by: Chrissy Houston MD    Performed by: KWABENA Monaco-CRNA, MUSTAPHA  Patient location during procedure: OR    Indications and Patient Condition  Indications for airway management: anesthesia  Spontaneous Ventilation: absent  Sedation level: deep  Preoxygenated: yes  Patient position: sniffing  Mask difficulty assessment: 0 - not attempted    Final Airway Details  Final airway type: supraglottic airway      Successful airway: Size 3     Number of attempts at approach: 1

## 2024-08-29 NOTE — DISCHARGE INSTRUCTIONS
Additional instructions  Ice/Elevate operative extremity.  Keep dressing clean and dry.    Keep dressing in place.  Weightbearing: NWB on operative extremity with crutches/walker. Splint.  Oxycodone 1 by mouth every 6 hours as needed for pain.  Start Tylenol 650 mg by mouth every 6 hours as needed for pain.  Aspirin 81 mg by mouth twice daily.  F/U with Dr. Shell in 2 weeks.  Call [phone] for appointment time.

## 2024-08-29 NOTE — ANESTHESIA PROCEDURE NOTES
Peripheral Block    Patient location during procedure: pre-op  Start time: 8/29/2024 9:15 AM  End time: 8/29/2024 9:25 AM  Reason for block: at surgeon's request and post-op pain management  Staffing  Performed: attending   Authorized by: Chrissy Houston MD    Performed by: Chrissy Houston MD  Preanesthetic Checklist  Completed: patient identified, IV checked, site marked, risks and benefits discussed, surgical consent, monitors and equipment checked, pre-op evaluation and timeout performed   Timeout performed at: 8/29/2024 9:14 AM  Peripheral Block  Patient position: laying flat  Prep: ChloraPrep and site prepped and draped  Patient monitoring: heart rate, cardiac monitor and continuous pulse ox  Block type: adductor canal, sciatic and popliteal  Laterality: right  Injection technique: single-shot  Guidance: nerve stimulator, ultrasound guided and Ultrasound image saved to chart  Local infiltration: lidocaine (Skin)  Infiltration strength: 1 %  Dose: 3 mL  Needle  Needle type: short-bevel   Needle gauge: 22 G  Needle length: 8 cm  Needle localization: ultrasound guidance and anatomical landmarks (ultrasound image saved to chart.)  Test dose: negative  Assessment  Injection assessment: negative aspiration for heme, no paresthesia on injection, incremental injection and local visualized surrounding nerve on ultrasound  Paresthesia pain: none  Heart rate change: no  Slow fractionated injection: yes  Additional Notes  Pre-medication with Versed 2 mg  intravenously.  Following a focussed neurological history, procedure-related and patient-specific complications were discussed. Verbal consent was provided by the patient and/or surrogate decision maker for Popliteal Sciatic (Primary) block and Adductor Canal (Secondary) Block. Anticoagulation (if any) was held per DANIELA guidelines.  Aseptic prep and drape. A 22 G echogenic needle was used with a nerve stimulator. No Evoked Motor Response was visible at < 0.3 mA. Using  in-plane needle visualization, 40 ml of 0.375% Bupivacaine with epi 5 mcg/ml and decadron 4 mg was injected extraneurally in 5 ml increments, 25 ml for Popliteal Sciatic block and 15 ml for Adductor Canal block. There was no resistance to injection and appropriate injection pressures were maintained based on tactile feedback. Throughout the procedure, there was consistent and meaningful verbal communication with the patient. Post procedure vital signs were stable and no immediate complications were noted. PACU will provide written instructions that outline the specific precautions to be taken when caring for an akinetic, insensate extremity.

## 2024-09-11 ENCOUNTER — HOSPITAL ENCOUNTER (OUTPATIENT)
Dept: RADIOLOGY | Facility: CLINIC | Age: 64
Discharge: HOME | End: 2024-09-11

## 2024-09-11 ENCOUNTER — APPOINTMENT (OUTPATIENT)
Dept: ORTHOPEDIC SURGERY | Facility: CLINIC | Age: 64
End: 2024-09-11

## 2024-09-11 VITALS — WEIGHT: 125 LBS | HEIGHT: 65 IN | BODY MASS INDEX: 20.83 KG/M2

## 2024-09-11 DIAGNOSIS — S92.041A OTHER CLOSED DISPLACED FRACTURE OF TUBEROSITY OF RIGHT CALCANEUS, INITIAL ENCOUNTER: ICD-10-CM

## 2024-09-11 PROCEDURE — 73650 X-RAY EXAM OF HEEL: CPT | Mod: RIGHT SIDE | Performed by: RADIOLOGY

## 2024-09-11 PROCEDURE — 99024 POSTOP FOLLOW-UP VISIT: CPT | Performed by: ORTHOPAEDIC SURGERY

## 2024-09-11 PROCEDURE — L4361 PNEUMA/VAC WALK BOOT PRE OTS: HCPCS | Performed by: ORTHOPAEDIC SURGERY

## 2024-09-11 PROCEDURE — 73650 X-RAY EXAM OF HEEL: CPT | Mod: RT

## 2024-09-11 ASSESSMENT — PAIN - FUNCTIONAL ASSESSMENT: PAIN_FUNCTIONAL_ASSESSMENT: NO/DENIES PAIN

## 2024-09-11 NOTE — PROGRESS NOTES
Subjective: Doing great after surgery. No issues with surgical sites. Has been compliant with NWB in splint. No pain.    Objective:  Right heel incisions all well healed. Mild global swelling. No erythema. Wiggles toes. Sensation intact to light touch to foot. Toes WWP.    XR right calcaneus obtained and independently interpreted today: Intact fracture reduction and hardware. No new fracture or dislocation.    Assessment: Post op right calcaneus closed reduction percutaneous screw fixation    Plan: Transition to cam boot today. Continue NWB, though OK to  one spot with boot on. Stitches taken out today. Counseled to continue to reduce smoking and take calcium/vitamin D to assist in bone healing. Follow up in 4 weeks with repeat right calcaneus Xrays.

## 2024-09-11 NOTE — PROGRESS NOTES
Patient was reviewed and discussed with TANISHA and/or orthopedic resident.  Patient was seen and evaluated in conjunction with TANISHA and/or orthopedic resident. Findings and treatment plan were discussed and approved by myself, Dr. Shell.    Assessment: Status post ORIF right calcaneus     Plan: Sutures out.  Tall cast boot.  Protected weightbearing with walker.  Follow-up x-ray right heel in 4 weeks.

## 2024-10-09 ENCOUNTER — APPOINTMENT (OUTPATIENT)
Dept: ORTHOPEDIC SURGERY | Facility: CLINIC | Age: 64
End: 2024-10-09

## 2024-10-09 ENCOUNTER — HOSPITAL ENCOUNTER (OUTPATIENT)
Dept: RADIOLOGY | Facility: CLINIC | Age: 64
Discharge: HOME | End: 2024-10-09

## 2024-10-09 DIAGNOSIS — S92.041A OTHER CLOSED DISPLACED FRACTURE OF TUBEROSITY OF RIGHT CALCANEUS, INITIAL ENCOUNTER: ICD-10-CM

## 2024-10-09 DIAGNOSIS — S92.041A OTHER CLOSED DISPLACED FRACTURE OF TUBEROSITY OF RIGHT CALCANEUS, INITIAL ENCOUNTER: Primary | ICD-10-CM

## 2024-10-09 PROCEDURE — 99024 POSTOP FOLLOW-UP VISIT: CPT | Performed by: ORTHOPAEDIC SURGERY

## 2024-10-09 PROCEDURE — 73650 X-RAY EXAM OF HEEL: CPT | Mod: RIGHT SIDE | Performed by: RADIOLOGY

## 2024-10-09 PROCEDURE — 73650 X-RAY EXAM OF HEEL: CPT | Mod: RT

## 2024-10-09 NOTE — PROGRESS NOTES
No pain.  In boot.    Exam: Good ankle and subtalar motion.  No crepitus.    I personally reviewed the following radiographic exams: Right heel shows healing calcaneal tongue type fracture.  Stable hardware.    Assessment: Status post ORIF right calcaneus fracture.    Plan: Progress weightbearing in boot over the next 2 weeks.  Follow-up x-ray right heel at that time.  Hope to transition to shoewear.

## 2024-10-23 ENCOUNTER — HOSPITAL ENCOUNTER (OUTPATIENT)
Dept: RADIOLOGY | Facility: CLINIC | Age: 64
Discharge: HOME | End: 2024-10-23

## 2024-10-23 ENCOUNTER — APPOINTMENT (OUTPATIENT)
Dept: ORTHOPEDIC SURGERY | Facility: CLINIC | Age: 64
End: 2024-10-23
Payer: COMMERCIAL

## 2024-10-23 DIAGNOSIS — S92.041A OTHER CLOSED DISPLACED FRACTURE OF TUBEROSITY OF RIGHT CALCANEUS, INITIAL ENCOUNTER: ICD-10-CM

## 2024-10-23 DIAGNOSIS — S92.041A OTHER CLOSED DISPLACED FRACTURE OF TUBEROSITY OF RIGHT CALCANEUS, INITIAL ENCOUNTER: Primary | ICD-10-CM

## 2024-10-23 PROCEDURE — 73650 X-RAY EXAM OF HEEL: CPT | Mod: RIGHT SIDE | Performed by: RADIOLOGY

## 2024-10-23 PROCEDURE — 73650 X-RAY EXAM OF HEEL: CPT | Mod: RT

## 2024-10-23 PROCEDURE — 99024 POSTOP FOLLOW-UP VISIT: CPT | Performed by: ORTHOPAEDIC SURGERY

## 2024-10-23 ASSESSMENT — PAIN - FUNCTIONAL ASSESSMENT: PAIN_FUNCTIONAL_ASSESSMENT: NO/DENIES PAIN

## 2024-10-23 NOTE — PROGRESS NOTES
Some pain and swelling in the ankle in the boot.  Has been partial weightbearing.    Exam: Small suture reaction the lateral incision.  No induration.  No pain with ankle or subtalar motion.    I personally reviewed the following radiographic exams: Right heel shows healed calcaneus fracture with stable hardware.    Assessment: Status post ORIF right calcaneus.    Plan: Progress weightbearing in boot.  Wean to supportive shoewear with crutches.  Follow-up x-ray right heel in 1 month.

## 2024-11-20 ENCOUNTER — APPOINTMENT (OUTPATIENT)
Dept: ORTHOPEDIC SURGERY | Facility: CLINIC | Age: 64
End: 2024-11-20
Payer: COMMERCIAL

## 2024-11-20 ENCOUNTER — HOSPITAL ENCOUNTER (OUTPATIENT)
Dept: RADIOLOGY | Facility: CLINIC | Age: 64
Discharge: HOME | End: 2024-11-20

## 2024-11-20 DIAGNOSIS — S92.041A OTHER CLOSED DISPLACED FRACTURE OF TUBEROSITY OF RIGHT CALCANEUS, INITIAL ENCOUNTER: ICD-10-CM

## 2024-11-20 DIAGNOSIS — S92.041A OTHER CLOSED DISPLACED FRACTURE OF TUBEROSITY OF RIGHT CALCANEUS, INITIAL ENCOUNTER: Primary | ICD-10-CM

## 2024-11-20 PROCEDURE — 73650 X-RAY EXAM OF HEEL: CPT | Mod: RT

## 2024-11-20 PROCEDURE — 99024 POSTOP FOLLOW-UP VISIT: CPT | Performed by: ORTHOPAEDIC SURGERY

## 2024-11-20 PROCEDURE — 73650 X-RAY EXAM OF HEEL: CPT | Mod: RIGHT SIDE | Performed by: STUDENT IN AN ORGANIZED HEALTH CARE EDUCATION/TRAINING PROGRAM

## 2024-11-20 NOTE — PROGRESS NOTES
Returns for right heel.  Wearing regular athletic shoes.  Still using 2 crutches for support.  Starting to work on exercise on her own.    Exam: Minimal swelling.  Good ankle motion.  Obvious calf atrophy.    I personally reviewed the following radiographic exams: Right heel shows healed calcaneus fracture.  Stable hardware.    Assessment: Healed right calcaneal fracture.    Plan: Offered therapy.  Will work on exercise on her own.  No restrictions.  Follow-up as needed.

## (undated) DEVICE — GLOVE, SURGICAL, PROTEXIS PI ORTHO, 8.0, PF, LF

## (undated) DEVICE — SUTURE, ETHILON, 3-0, 18 IN, PS2, BLACK

## (undated) DEVICE — CUFF, TOURNIQUET, 30 X 4, DUAL PORT/SNGL BLADDER, DISP, LF

## (undated) DEVICE — SUTURE, MONOCRYL, 4-0, 27 IN, PS-2, UNDYED

## (undated) DEVICE — COVER, MAYO STAND, W/PAD, 23 IN, DISPOSABLE, PLASTIC, LF, STERILE

## (undated) DEVICE — SUTURE, ETHILON, 4-0, BLK, MONO, PS-2 18

## (undated) DEVICE — Device

## (undated) DEVICE — GLOVE, SURGICAL, PROTEXIS PI MICRO, 8.0, PF, LF

## (undated) DEVICE — PAD, GROUNDING, ELECTROSURGICAL, W/9 FT CABLE, POLYHESIVE II, ADULT, LF

## (undated) DEVICE — SUTURE, VICRYL, 2-0, 27 IN, SH, UNDYED

## (undated) DEVICE — SOLUTION, IRRIGATION, SODIUM CHLORIDE 0.9%, 1000 ML, POUR BOTTLE

## (undated) DEVICE — CAUTERY, PENCIL, PUSH BUTTON, SMOKE EVAC, 70MM

## (undated) DEVICE — GLOVE, SURGICAL, PROTEXIS PI W/NEU-THERA, 8.0, PF, LF

## (undated) DEVICE — SUTURE, MONOCRYL, 3-0, 27 IN, PS-2, UNDYED

## (undated) DEVICE — NEEDLE, HYPODERMIC, 25 G X 1.5 IN, A BEVEL, STERILE

## (undated) DEVICE — COVER, EQUIPMENT, C ARM, W/ STRAPS